# Patient Record
Sex: FEMALE | Race: WHITE | NOT HISPANIC OR LATINO | Employment: UNEMPLOYED | URBAN - METROPOLITAN AREA
[De-identification: names, ages, dates, MRNs, and addresses within clinical notes are randomized per-mention and may not be internally consistent; named-entity substitution may affect disease eponyms.]

---

## 2024-09-03 ENCOUNTER — OFFICE VISIT (OUTPATIENT)
Dept: OBSTETRICS AND GYNECOLOGY | Age: 24
End: 2024-09-03
Payer: COMMERCIAL

## 2024-09-03 VITALS
SYSTOLIC BLOOD PRESSURE: 110 MMHG | HEIGHT: 68 IN | WEIGHT: 132 LBS | BODY MASS INDEX: 20 KG/M2 | DIASTOLIC BLOOD PRESSURE: 66 MMHG

## 2024-09-03 DIAGNOSIS — O09.30 LATE PRENATAL CARE: ICD-10-CM

## 2024-09-03 DIAGNOSIS — Z34.00 SUPERVISION OF NORMAL FIRST PREGNANCY, ANTEPARTUM: Primary | ICD-10-CM

## 2024-09-03 DIAGNOSIS — Z78.9 VEGETARIAN: ICD-10-CM

## 2024-09-03 DIAGNOSIS — Z3A.20 20 WEEKS GESTATION OF PREGNANCY: ICD-10-CM

## 2024-09-03 LAB
BILIRUB BLD-MCNC: NEGATIVE MG/DL
CLARITY, POC: CLEAR
COLOR UR: YELLOW
GLUCOSE UR STRIP-MCNC: NEGATIVE MG/DL
KETONES UR QL: ABNORMAL
LEUKOCYTE EST, POC: NEGATIVE
NITRITE UR-MCNC: NEGATIVE MG/ML
PH UR: 7 [PH] (ref 5–8)
PROT UR STRIP-MCNC: NEGATIVE MG/DL
RBC # UR STRIP: ABNORMAL /UL
SP GR UR: 1.02 (ref 1–1.03)
UROBILINOGEN UR QL: ABNORMAL

## 2024-09-03 PROCEDURE — 99203 OFFICE O/P NEW LOW 30 MIN: CPT | Performed by: STUDENT IN AN ORGANIZED HEALTH CARE EDUCATION/TRAINING PROGRAM

## 2024-09-03 NOTE — PROGRESS NOTES
[facility]   Obstetrics and Gynecology   New Gynecology Visit    9/3/2024    Patient: Aruna Graves          MR#:[id number]    History of Present Illness    Chief Complaint   Patient presents with    Gynecologic Exam     New Gyn, US today for fetal viability, LMP 24, Pt has no complaints today       24 y.o. female  who presents for viability scan.    Patient overall feeling better since the first trimester.  She did have nausea and vomiting at that time but that has resolved.  He is feeling some fatigue but again has improved since the first trimester.  Denies vaginal bleeding or any pelvic pain.    Patient does follow a vegetarian diet and has for many years.  She eats eggs and dairy.  She is trying to get protein from quinoa and beans.  Previously has been on a B12 supplement but is not currently.  She has put on about 10 pounds since getting pregnant    Obstetric History:  OB History          1    Para        Term                AB        Living             SAB        IAB        Ectopic        Molar        Multiple        Live Births                   Menstrual History:     Patient's last menstrual period was 2024 (exact date).       Sexual History:   Monogamous sexual relationship with male    Concern for STI?: denies  Last Pap: ~, normal - would like to defer until post-partum  Abnormal Pap hx: denies  ________________________________________  There is no problem list on file for this patient.    History reviewed. No pertinent past medical history.  Past Surgical History:   Procedure Laterality Date    TONSILLECTOMY  2013    WISDOM TOOTH EXTRACTION  2018     Social History     Tobacco Use   Smoking Status Never   Smokeless Tobacco Never     Family History   Problem Relation Age of Onset    Hypertension Mother     Pancreatic cancer Paternal Grandfather 74        noted as genetic, father has gene as well but no cancer    Breast cancer Maternal Grandmother 65         "Did not test for the gene, was ruled lifestyle induced.    Stroke Maternal Grandmother         Lifestyle induced.    Uterine cancer Neg Hx     Ovarian cancer Neg Hx     Colon cancer Neg Hx     Prostate cancer Neg Hx      Prior to Admission medications    Medication Sig Start Date End Date Taking? Authorizing Provider   phenazopyridine (PYRIDIUM) 200 MG tablet Take 1 tablet by mouth 3 (Three) Times a Day As Needed for Bladder Spasms.  Patient not taking: Reported on 9/3/2024 1/17/24   Roman Georges MD     ________________________________________    Review of Systems   Constitutional:  Negative for chills and fever.   Gastrointestinal:  Negative for abdominal pain, constipation, diarrhea, nausea and vomiting.   Genitourinary:  Negative for dysuria, frequency and urgency.          Objective     /66   Ht 172.7 cm (67.99\")   Wt 59.9 kg (132 lb)   LMP 04/12/2024 (Exact Date)   BMI 20.08 kg/m²    BP Readings from Last 3 Encounters:   09/03/24 110/66   01/17/24 112/71      Wt Readings from Last 3 Encounters:   09/03/24 59.9 kg (132 lb)   01/17/24 54.4 kg (120 lb)        BMI: Estimated body mass index is 20.08 kg/m² as calculated from the following:    Height as of this encounter: 172.7 cm (67.99\").    Weight as of this encounter: 59.9 kg (132 lb).    Physical Exam  Vitals and nursing note reviewed.   Constitutional:       General: She is not in acute distress.     Appearance: Normal appearance.   HENT:      Head: Normocephalic and atraumatic.   Eyes:      Extraocular Movements: Extraocular movements intact.   Cardiovascular:      Rate and Rhythm: Normal rate and regular rhythm.   Pulmonary:      Effort: Pulmonary effort is normal. No respiratory distress.   Abdominal:      General: There is no distension.      Palpations: Abdomen is soft. There is no mass.      Tenderness: There is no abdominal tenderness.   Musculoskeletal:         General: No swelling. Normal range of motion.      Cervical back: Normal range " of motion.   Skin:     General: Skin is warm and dry.   Neurological:      General: No focal deficit present.      Mental Status: She is alert and oriented to person, place, and time.   Psychiatric:         Mood and Affect: Mood normal.         Behavior: Behavior normal.       TVUS 9/3/2024  Impression    1.  Intrauterine pregnancy at 20w4d in vertex position  2.  Viable fetus,  bpm  3.  EDC: Estimated Date of Delivery: None noted.  4.  Cervical length 4.53cm  5.  Anterior placenta  6.  Normal anatomic evaluation - some limited views of heart due to fetal position    ROLLY 2025 by LMP consistent with today's ultrasound    Assessment:  Aruna Graves is a 24 y.o.  at 20w4d by LMP consistent with 20wk US done today presenting for viability scan.    Diagnoses and all orders for this visit:    1. Supervision of normal first pregnancy, antepartum (Primary)  Overview:  -PNL: collect today  -Pap: defer to PP - last ~ NILM per patient report  -Genetics: anatomy US wnl - some limited cardiac views  -Imm: titers today, tdap > 27wga, will discuss covid vaccine  -Contraception: discuss at future visit  -Birthplan: desires vaginal delivery, thinking she does not want epidural  -Care coordination: accepts blood transfusions, no latex allergy, call schedule reviewed, discuss this provider will be on maternity leave when patient delivers - would like to continue care with this provider at this time      Orders:  -     POC Urinalysis Dipstick  -     OB Panel With HIV and RPR  -     Varicella Zoster Antibody, IgG  -     Hemoglobin A1c  -     Cancel: Hepatitis C Antibody  -     Rubella Antibody, IgG  -     Urine Culture - Urine, Urine, Clean Catch  -     Chlamydia trachomatis, Neisseria gonorrhoeae, Trichomonas vaginalis, PCR - Urine, Urine, Clean Catch    2. 20 weeks gestation of pregnancy    3. Vegetarian  Overview:  Does eat eggs and dairy.      4. Late prenatal care  Overview:  First encounter at 20  weeks        Plan:  Return in about 4 weeks (around 10/1/2024) for F/u prenatal 4 wk - initial OB.      Michelle Munroe MD  9/3/2024 09:53 EDT

## 2024-09-04 LAB
ABO GROUP BLD: NORMAL
BASOPHILS # BLD AUTO: 0 X10E3/UL (ref 0–0.2)
BASOPHILS NFR BLD AUTO: 0 %
BLD GP AB SCN SERPL QL: NEGATIVE
EOSINOPHIL # BLD AUTO: 0 X10E3/UL (ref 0–0.4)
EOSINOPHIL NFR BLD AUTO: 1 %
ERYTHROCYTE [DISTWIDTH] IN BLOOD BY AUTOMATED COUNT: 12.2 % (ref 11.7–15.4)
HBA1C MFR BLD: 4.7 % (ref 4.8–5.6)
HBV SURFACE AG SERPL QL IA: NEGATIVE
HCT VFR BLD AUTO: 37.7 % (ref 34–46.6)
HCV AB SERPL QL IA: NORMAL
HCV IGG SERPL QL IA: NON REACTIVE
HGB BLD-MCNC: 12.3 G/DL (ref 11.1–15.9)
HIV 1+2 AB+HIV1 P24 AG SERPL QL IA: NON REACTIVE
IMM GRANULOCYTES # BLD AUTO: 0 X10E3/UL (ref 0–0.1)
IMM GRANULOCYTES NFR BLD AUTO: 0 %
LYMPHOCYTES # BLD AUTO: 1.2 X10E3/UL (ref 0.7–3.1)
LYMPHOCYTES NFR BLD AUTO: 16 %
MCH RBC QN AUTO: 31.3 PG (ref 26.6–33)
MCHC RBC AUTO-ENTMCNC: 32.6 G/DL (ref 31.5–35.7)
MCV RBC AUTO: 96 FL (ref 79–97)
MONOCYTES # BLD AUTO: 0.3 X10E3/UL (ref 0.1–0.9)
MONOCYTES NFR BLD AUTO: 5 %
NEUTROPHILS # BLD AUTO: 5.6 X10E3/UL (ref 1.4–7)
NEUTROPHILS NFR BLD AUTO: 78 %
PLATELET # BLD AUTO: 265 X10E3/UL (ref 150–450)
RBC # BLD AUTO: 3.93 X10E6/UL (ref 3.77–5.28)
RH BLD: POSITIVE
RPR SER QL: NON REACTIVE
RUBV IGG SERPL IA-ACNC: 1.26 INDEX
VZV IGG SER IA-ACNC: 258 INDEX
WBC # BLD AUTO: 7.2 X10E3/UL (ref 3.4–10.8)

## 2024-09-05 LAB
BACTERIA UR CULT: NORMAL
BACTERIA UR CULT: NORMAL
C TRACH RRNA SPEC QL NAA+PROBE: NEGATIVE
N GONORRHOEA RRNA SPEC QL NAA+PROBE: NEGATIVE
T VAGINALIS RRNA SPEC QL NAA+PROBE: NEGATIVE

## 2024-09-06 ENCOUNTER — PATIENT ROUNDING (BHMG ONLY) (OUTPATIENT)
Dept: OBSTETRICS AND GYNECOLOGY | Age: 24
End: 2024-09-06
Payer: COMMERCIAL

## 2024-09-06 NOTE — PROGRESS NOTES
A MY CHART MESSAGE HAS BEEN SENT TO THE PATIENT FOR [facility].

## 2024-10-01 ENCOUNTER — INITIAL PRENATAL (OUTPATIENT)
Dept: OBSTETRICS AND GYNECOLOGY | Age: 24
End: 2024-10-01
Payer: OTHER GOVERNMENT

## 2024-10-01 VITALS — SYSTOLIC BLOOD PRESSURE: 106 MMHG | BODY MASS INDEX: 20.96 KG/M2 | DIASTOLIC BLOOD PRESSURE: 62 MMHG | WEIGHT: 137.8 LBS

## 2024-10-01 DIAGNOSIS — Z34.00 SUPERVISION OF NORMAL FIRST PREGNANCY, ANTEPARTUM: Primary | ICD-10-CM

## 2024-10-01 DIAGNOSIS — O09.30 LATE PRENATAL CARE: ICD-10-CM

## 2024-10-01 DIAGNOSIS — Z3A.24 24 WEEKS GESTATION OF PREGNANCY: ICD-10-CM

## 2024-10-01 DIAGNOSIS — Z78.9 VEGETARIAN: ICD-10-CM

## 2024-10-01 LAB
CLARITY, POC: CLEAR
COLOR UR: YELLOW
GLUCOSE UR STRIP-MCNC: NEGATIVE MG/DL
PROT UR STRIP-MCNC: NEGATIVE MG/DL

## 2024-10-01 NOTE — PROGRESS NOTES
[facility]   Obstetrics and Gynecology   New Obstetric Visit    10/1/2024    Patient: Aruna Graves          MR#:[id number]    History of Present Illness    Chief Complaint   Patient presents with    Initial Prenatal Visit     24w4d Ob f/u, Pt has no complaints today     24 y.o. female  at 24w4d presents for NOB visit.  She is doing well today.  Taking prenatal vitamins.    Starting to feel some fetal movements particularly in morning and at night. Denies vaginal bleeding, leakage of fluid, regular contractions.  ________________________________________  Patient Active Problem List   Diagnosis    Supervision of normal first pregnancy, antepartum    Vegetarian    Late prenatal care     OB History          1    Para        Term                AB        Living             SAB        IAB        Ectopic        Molar        Multiple        Live Births                  Social History:  Denies h/o herpes, syphilis, HIV  Denies family history of birth defects and genetic disorders    Past Medical History:   Diagnosis Date    Vegetarian diet      Past Surgical History:   Procedure Laterality Date    TONSILLECTOMY  2013    WISDOM TOOTH EXTRACTION  2018     Social History     Tobacco Use   Smoking Status Never   Smokeless Tobacco Never     Family History   Problem Relation Age of Onset    Hypertension Mother     Pancreatic cancer Paternal Grandfather 74        noted as genetic, father has gene as well but no cancer    Breast cancer Maternal Grandmother 65        Did not test for the gene, was ruled lifestyle induced.    Stroke Maternal Grandmother         Lifestyle induced.    Uterine cancer Neg Hx     Ovarian cancer Neg Hx     Colon cancer Neg Hx     Prostate cancer Neg Hx      Prior to Admission medications    Medication Sig Start Date End Date Taking? Authorizing Provider   Prenatal Vit-Fe Fumarate-FA (PRENATAL VITAMIN PO) Take  by mouth.   Yes Provider, MD Johnathon  "    ________________________________________    Review of Systems   Constitutional:  Negative for chills and fever.   Gastrointestinal:  Negative for abdominal pain, constipation, diarrhea, nausea and vomiting.   Genitourinary:  Negative for dysuria, frequency and urgency.            Objective     /62   Wt 62.5 kg (137 lb 12.8 oz)   LMP 2024 (Exact Date)   BMI 20.96 kg/m²    BP Readings from Last 3 Encounters:   10/01/24 106/62   24 110/66   24 112/71      Wt Readings from Last 3 Encounters:   10/01/24 62.5 kg (137 lb 12.8 oz)   24 59.9 kg (132 lb)   24 54.4 kg (120 lb)        BMI: Estimated body mass index is 20.96 kg/m² as calculated from the following:    Height as of 9/3/24: 172.7 cm (67.99\").    Weight as of this encounter: 62.5 kg (137 lb 12.8 oz).    Physical Exam  Vitals and nursing note reviewed.   Constitutional:       General: She is not in acute distress.     Appearance: Normal appearance.   HENT:      Head: Normocephalic and atraumatic.   Eyes:      Extraocular Movements: Extraocular movements intact.   Cardiovascular:      Rate and Rhythm: Normal rate and regular rhythm.   Pulmonary:      Effort: Pulmonary effort is normal. No respiratory distress.   Abdominal:      General: There is no distension.      Palpations: Abdomen is soft. There is no mass.      Tenderness: There is no abdominal tenderness.   Musculoskeletal:         General: No swelling. Normal range of motion.      Cervical back: Normal range of motion.   Skin:     General: Skin is warm and dry.   Neurological:      General: No focal deficit present.      Mental Status: She is alert and oriented to person, place, and time.   Psychiatric:         Mood and Affect: Mood normal.         Behavior: Behavior normal.       Assessment:  Aruna Graves is a 24 y.o.  at 24w4d presenting for new OB visit.    Diagnoses and all orders for this visit:    1. Supervision of normal first pregnancy, antepartum " (Primary)  Overview:  -PNL: unremarkable  -Pap: defer to PP - last ~2021 NILM per patient report  -Genetics: declined CF/SMA/NIPS, anatomy US wnl - some limited cardiac views  -Imm: VI/RI, tdap > 27wga, will discuss covid vaccine  -Contraception: discuss at future visit  -Birthplan: desires vaginal delivery, thinking she does not want epidural, would like to keep placenta  -Care coordination: accepts blood transfusions, no latex allergy, call schedule reviewed, discuss this provider will be on maternity leave when patient delivers - would like to continue care with this provider at this time        2. 24 weeks gestation of pregnancy  -     POC Urinalysis Dipstick    3. Late prenatal care  Overview:  First encounter at 20 weeks      4. Vegetarian  Overview:  Does eat eggs and dairy.        Plan:  Return for F/u prenatal 4 wk.    Michelle Munroe MD  10/1/2024 09:04 EDT

## 2024-10-30 ENCOUNTER — TELEPHONE (OUTPATIENT)
Dept: OBSTETRICS AND GYNECOLOGY | Age: 24
End: 2024-10-30

## 2024-10-30 NOTE — TELEPHONE ENCOUNTER
Caller: Star Aruna    Relationship: Self    Best call back number: [phone]    What is the best time to reach you: ANY    Who are you requesting to speak with (clinical staff, provider,  specific staff member): CLINICAL       What was the call regarding: PT IS NEEDING A DENTIST RELEASE FORM FAXED [phone]

## 2024-11-05 ENCOUNTER — ROUTINE PRENATAL (OUTPATIENT)
Dept: OBSTETRICS AND GYNECOLOGY | Age: 24
End: 2024-11-05
Payer: OTHER GOVERNMENT

## 2024-11-05 VITALS — DIASTOLIC BLOOD PRESSURE: 72 MMHG | BODY MASS INDEX: 22.78 KG/M2 | SYSTOLIC BLOOD PRESSURE: 108 MMHG | WEIGHT: 149.8 LBS

## 2024-11-05 DIAGNOSIS — Z34.03 ENCOUNTER FOR SUPERVISION OF NORMAL FIRST PREGNANCY IN THIRD TRIMESTER: Primary | ICD-10-CM

## 2024-11-05 DIAGNOSIS — Z23 NEED FOR DIPHTHERIA-TETANUS-PERTUSSIS (TDAP) VACCINE: ICD-10-CM

## 2024-11-05 DIAGNOSIS — Z13.1 SCREENING FOR DIABETES MELLITUS (DM): ICD-10-CM

## 2024-11-05 DIAGNOSIS — O09.30 LATE PRENATAL CARE: ICD-10-CM

## 2024-11-05 DIAGNOSIS — Z34.00 SUPERVISION OF NORMAL FIRST PREGNANCY, ANTEPARTUM: ICD-10-CM

## 2024-11-05 DIAGNOSIS — Z78.9 VEGETARIAN: ICD-10-CM

## 2024-11-05 PROCEDURE — 0502F SUBSEQUENT PRENATAL CARE: CPT | Performed by: STUDENT IN AN ORGANIZED HEALTH CARE EDUCATION/TRAINING PROGRAM

## 2024-11-05 NOTE — PROGRESS NOTES
[facility]   Obstetrics and Gynecology   Return Obstetric Visit    Aruna Graves, a 24 y.o.  at 29w4d, presents for OB follow-up.  She is doing well today.  Denies loss of fluid, vaginal bleeding, and contractions.  Endorses fetal movements.    Objective  BP Readings from Last 3 Encounters:   24 108/72   10/01/24 106/62   24 110/66      Wt Readings from Last 3 Encounters:   24 67.9 kg (149 lb 12.8 oz)   10/01/24 62.5 kg (137 lb 12.8 oz)   24 59.9 kg (132 lb)      Total weight gain this pregnancy: Not found.    General: Awake, alert, no apparent distress  Respiratory: No increased work of breathing  Abdomen: Fundus soft and nontender  Extremity: Nontender, no edema    Aruna Graves is a 24 y.o.  at 29w4d presenting for OB follow-up.    Diagnoses and all orders for this visit:    1. Encounter for supervision of normal first pregnancy in third trimester (Primary)  -     CBC & Differential  -     Treponema pallidum AB w/Reflex RPR; Future  -     Treponema pallidum AB w/Reflex RPR    2. Screening for diabetes mellitus (DM)  -     Gestational Screen 1 Hr ([facility])    3. Need for diphtheria-tetanus-pertussis (Tdap) vaccine  -     Tdap Vaccine => 8yo IM (BOOSTRIX/ADACEL)    4. Supervision of normal first pregnancy, antepartum  Overview:  -PNL: unremarkable  -Pap: defer to PP - last ~ NILM per patient report  -Genetics: declined CF/SMA/NIPS, anatomy US wnl - some limited cardiac views  -Imm: VI/RI, tdap > 27wga, declines flu/ covid vaccine 2024   -Contraception: discuss at future visit  -Birthplan: desires vaginal delivery, thinking she does not want epidural, would like to keep placenta  -Care coordination: accepts blood transfusions, no latex allergy, call schedule reviewed        5. Late prenatal care  Overview:  First encounter at 20 weeks      6. Vegetarian  Overview:  Does eat eggs and dairy.        Labor precautions reviewed. Declines flu/covid  vaccination.  Return in about 3 weeks (around 11/26/2024) for OB check.    Michelle Munroe MD  11/5/2024 09:48 EST

## 2024-11-06 LAB
BASOPHILS # BLD AUTO: 0.02 10*3/MM3 (ref 0–0.2)
BASOPHILS NFR BLD AUTO: 0.2 % (ref 0–1.5)
EOSINOPHIL # BLD AUTO: 0.03 10*3/MM3 (ref 0–0.4)
EOSINOPHIL NFR BLD AUTO: 0.3 % (ref 0.3–6.2)
ERYTHROCYTE [DISTWIDTH] IN BLOOD BY AUTOMATED COUNT: 11.7 % (ref 12.3–15.4)
GLUCOSE 1H P 50 G GLC PO SERPL-MCNC: 157 MG/DL (ref 65–139)
HCT VFR BLD AUTO: 35 % (ref 34–46.6)
HGB BLD-MCNC: 11.5 G/DL (ref 12–15.9)
IMM GRANULOCYTES # BLD AUTO: 0.08 10*3/MM3 (ref 0–0.05)
IMM GRANULOCYTES NFR BLD AUTO: 0.9 % (ref 0–0.5)
LYMPHOCYTES # BLD AUTO: 1.38 10*3/MM3 (ref 0.7–3.1)
LYMPHOCYTES NFR BLD AUTO: 15 % (ref 19.6–45.3)
MCH RBC QN AUTO: 31.1 PG (ref 26.6–33)
MCHC RBC AUTO-ENTMCNC: 32.9 G/DL (ref 31.5–35.7)
MCV RBC AUTO: 94.6 FL (ref 79–97)
MONOCYTES # BLD AUTO: 0.42 10*3/MM3 (ref 0.1–0.9)
MONOCYTES NFR BLD AUTO: 4.6 % (ref 5–12)
NEUTROPHILS # BLD AUTO: 7.3 10*3/MM3 (ref 1.7–7)
NEUTROPHILS NFR BLD AUTO: 79 % (ref 42.7–76)
NRBC BLD AUTO-RTO: 0 /100 WBC (ref 0–0.2)
PLATELET # BLD AUTO: 244 10*3/MM3 (ref 140–450)
RBC # BLD AUTO: 3.7 10*6/MM3 (ref 3.77–5.28)
TREPONEMA PALLIDUM IGG+IGM AB [PRESENCE] IN SERUM OR PLASMA BY IMMUNOASSAY: NON REACTIVE
WBC # BLD AUTO: 9.23 10*3/MM3 (ref 3.4–10.8)

## 2024-11-07 PROBLEM — O99.810 ABNORMAL GLUCOSE IN PREGNANCY, ANTEPARTUM: Status: ACTIVE | Noted: 2024-11-07

## 2024-11-13 DIAGNOSIS — R73.09 ABNORMAL GLUCOSE TOLERANCE TEST (GTT): Primary | ICD-10-CM

## 2024-11-26 ENCOUNTER — ROUTINE PRENATAL (OUTPATIENT)
Dept: OBSTETRICS AND GYNECOLOGY | Age: 24
End: 2024-11-26
Payer: OTHER GOVERNMENT

## 2024-11-26 VITALS — BODY MASS INDEX: 22.51 KG/M2 | WEIGHT: 148 LBS | DIASTOLIC BLOOD PRESSURE: 72 MMHG | SYSTOLIC BLOOD PRESSURE: 110 MMHG

## 2024-11-26 DIAGNOSIS — O99.019 MATERNAL ANEMIA IN PREGNANCY, ANTEPARTUM: ICD-10-CM

## 2024-11-26 DIAGNOSIS — O09.30 LATE PRENATAL CARE: ICD-10-CM

## 2024-11-26 DIAGNOSIS — Z3A.32 32 WEEKS GESTATION OF PREGNANCY: Primary | ICD-10-CM

## 2024-11-26 LAB
GLUCOSE UR STRIP-MCNC: NEGATIVE MG/DL
PROT UR STRIP-MCNC: NEGATIVE MG/DL

## 2024-11-26 NOTE — PROGRESS NOTES
Chief Complaint   Patient presents with    Routine Prenatal Visit     32 week ob visit.        HPI: 24 y.o.  at 32w4d gestation  She is doing well  Feels good  Notes good FM  Is slightly low on her hgb   Will work on diet changes and increase bean intake  She measures slightly on the small side, possible transverse?   Plan EFW at next visit       Vitals:    24 0918   BP: 110/72   Weight: 67.1 kg (148 lb)       ROS:  GI:  Negative  : na  Pulmonary: Negative     A/P  1. Intrauterine pregnancy at 32w4d   2. Pregnancy Risk:  NORMAL    Diagnoses and all orders for this visit:    1. 32 weeks gestation of pregnancy (Primary)  -     POC Urinalysis Dipstick, Multipro    2. Late prenatal care    3. Maternal anemia in pregnancy, antepartum        -----------------------  PLAN:   Return in about 2 weeks (around 12/10/2024) for belly check with Dr Ramírez or Dr Drew, schedule u/s to check EFW/weight.      BOZENA Pradhan  2024 09:43 EST

## 2024-12-12 ENCOUNTER — ROUTINE PRENATAL (OUTPATIENT)
Dept: OBSTETRICS AND GYNECOLOGY | Age: 24
End: 2024-12-12
Payer: OTHER GOVERNMENT

## 2024-12-12 VITALS — DIASTOLIC BLOOD PRESSURE: 72 MMHG | BODY MASS INDEX: 23.09 KG/M2 | SYSTOLIC BLOOD PRESSURE: 130 MMHG | WEIGHT: 151.8 LBS

## 2024-12-12 DIAGNOSIS — Z3A.34 34 WEEKS GESTATION OF PREGNANCY: Primary | ICD-10-CM

## 2024-12-12 DIAGNOSIS — Z78.9 VEGETARIAN: ICD-10-CM

## 2024-12-12 DIAGNOSIS — Z34.00 SUPERVISION OF NORMAL FIRST PREGNANCY, ANTEPARTUM: ICD-10-CM

## 2024-12-12 DIAGNOSIS — Z34.03 ENCOUNTER FOR SUPERVISION OF NORMAL FIRST PREGNANCY IN THIRD TRIMESTER: ICD-10-CM

## 2024-12-12 DIAGNOSIS — O99.810 ABNORMAL GLUCOSE IN PREGNANCY, ANTEPARTUM: ICD-10-CM

## 2024-12-12 DIAGNOSIS — O09.30 LATE PRENATAL CARE: ICD-10-CM

## 2024-12-12 NOTE — PROGRESS NOTES
Aruna Graves, a 24 y.o.  at 34w6d, presents for OB follow-up.  She is doing well today.  Denies loss of fluid, vaginal bleeding, and contractions.  Endorses fetal movements.    Objective  BP Readings from Last 3 Encounters:   24 130/72   24 110/72   24 108/72      Wt Readings from Last 3 Encounters:   24 68.9 kg (151 lb 12.8 oz)   24 67.1 kg (148 lb)   24 67.9 kg (149 lb 12.8 oz)      Total weight gain this pregnancy: Not found.    General: Awake, alert, no apparent distress  Respiratory: No increased work of breathing  Abdomen: Fundus soft and nontender  Extremity: Nontender, no edema    A/P  Diagnoses and all orders for this visit:    1. 34 weeks gestation of pregnancy (Primary)    2. Encounter for supervision of normal first pregnancy in third trimester  -     POC Urinalysis Dipstick    3. Abnormal glucose in pregnancy, antepartum  Overview:  Elevated 1 hour GTT  4/4 3 hour GTT normal      4. Supervision of normal first pregnancy, antepartum  Overview:  -PNL: unremarkable  -Pap: defer to PP - last ~ NILM per patient report  -Genetics: declined CF/SMA/NIPS, anatomy US wnl - some limited cardiac views  -Imm: VI/RI, declined tdap 2024, declines flu/ covid vaccine 2024   -Contraception: discuss at future visit  -Birthplan: desires vaginal delivery, thinking she does not want epidural, would like to keep placenta  -Care coordination: accepts blood transfusions, no latex allergy, call schedule reviewed        5. Late prenatal care  Overview:  First encounter at 20 weeks      6. Vegetarian  Overview:  Does eat eggs and dairy.          Labor precautions reviewed  Return in about 2 weeks (around 2024).    Amy Drew MD

## 2024-12-26 ENCOUNTER — ROUTINE PRENATAL (OUTPATIENT)
Dept: OBSTETRICS AND GYNECOLOGY | Age: 24
End: 2024-12-26
Payer: OTHER GOVERNMENT

## 2024-12-26 VITALS — DIASTOLIC BLOOD PRESSURE: 78 MMHG | WEIGHT: 156.8 LBS | BODY MASS INDEX: 23.85 KG/M2 | SYSTOLIC BLOOD PRESSURE: 122 MMHG

## 2024-12-26 DIAGNOSIS — Z34.00 SUPERVISION OF NORMAL FIRST PREGNANCY, ANTEPARTUM: ICD-10-CM

## 2024-12-26 DIAGNOSIS — Z78.9 VEGETARIAN: ICD-10-CM

## 2024-12-26 DIAGNOSIS — Z34.03 ENCOUNTER FOR SUPERVISION OF NORMAL FIRST PREGNANCY IN THIRD TRIMESTER: Primary | ICD-10-CM

## 2024-12-26 DIAGNOSIS — O09.30 LATE PRENATAL CARE: ICD-10-CM

## 2024-12-26 DIAGNOSIS — O99.810 ABNORMAL GLUCOSE IN PREGNANCY, ANTEPARTUM: ICD-10-CM

## 2024-12-26 NOTE — PROGRESS NOTES
Aruna Graves, a 24 y.o.  at 36w6d, presents for OB follow-up.  She is doing well today.  Denies loss of fluid, vaginal bleeding, and contractions.  Endorses fetal movements.    Objective  BP Readings from Last 3 Encounters:   24 122/78   24 130/72   24 110/72      Wt Readings from Last 3 Encounters:   24 71.1 kg (156 lb 12.8 oz)   24 68.9 kg (151 lb 12.8 oz)   24 67.1 kg (148 lb)      Total weight gain this pregnancy: Not found.    General: Awake, alert, no apparent distress  Respiratory: No increased work of breathing  Abdomen: Fundus soft and nontender  Extremity: Nontender, no edema    A/P  Diagnoses and all orders for this visit:    1. Encounter for supervision of normal first pregnancy in third trimester (Primary)  -     POC Urinalysis Dipstick  -     Group B Streptococcus Culture - Swab, Vaginal/Rectum    2. Late prenatal care  Overview:  First encounter at 20 weeks      3. Vegetarian  Overview:  Does eat eggs and dairy.      4. Abnormal glucose in pregnancy, antepartum  Overview:  Elevated 1 hour GTT  4/4 3 hour GTT normal      5. Supervision of normal first pregnancy, antepartum  Overview:  -PNL: unremarkable  -Pap: defer to PP - last ~ NILM per patient report  -Genetics: declined CF/SMA/NIPS, anatomy US wnl - some limited cardiac views  -Imm: VI/RI, declined tdap 2024, declines flu/ covid vaccine 2024   -Contraception: discuss at future visit  -Birthplan: desires vaginal delivery, thinking she does not want epidural, would like to keep placenta  -Care coordination: accepts blood transfusions, no latex allergy, call schedule reviewed            Labor precautions reviewed  Return in about 1 week (around 2025).    Amy Drew MD

## 2024-12-30 LAB — B-HEM STREP SPEC QL CULT: NEGATIVE

## 2025-01-02 ENCOUNTER — TELEPHONE (OUTPATIENT)
Dept: OBSTETRICS AND GYNECOLOGY | Age: 25
End: 2025-01-02

## 2025-01-02 ENCOUNTER — ROUTINE PRENATAL (OUTPATIENT)
Dept: OBSTETRICS AND GYNECOLOGY | Age: 25
End: 2025-01-02
Payer: OTHER GOVERNMENT

## 2025-01-02 VITALS — BODY MASS INDEX: 23.57 KG/M2 | WEIGHT: 155 LBS | SYSTOLIC BLOOD PRESSURE: 122 MMHG | DIASTOLIC BLOOD PRESSURE: 70 MMHG

## 2025-01-02 DIAGNOSIS — Z78.9 VEGETARIAN: ICD-10-CM

## 2025-01-02 DIAGNOSIS — O09.30 LATE PRENATAL CARE: ICD-10-CM

## 2025-01-02 DIAGNOSIS — Z34.00 SUPERVISION OF NORMAL FIRST PREGNANCY, ANTEPARTUM: Primary | ICD-10-CM

## 2025-01-02 DIAGNOSIS — Z3A.37 37 WEEKS GESTATION OF PREGNANCY: ICD-10-CM

## 2025-01-02 DIAGNOSIS — O99.810 ABNORMAL GLUCOSE IN PREGNANCY, ANTEPARTUM: ICD-10-CM

## 2025-01-02 LAB
GLUCOSE UR STRIP-MCNC: NEGATIVE MG/DL
PROT UR STRIP-MCNC: NEGATIVE MG/DL

## 2025-01-02 NOTE — PROGRESS NOTES
Chief Complaint   Patient presents with    Routine Prenatal Visit     37 weeks  CC:  no problems       HPI: 24 y.o.  at 37w6d     Doing well  Reports good FM  Denies LOF, bleeding or ctx's  Pt of Dr. Drew    Vitals:    25 0901   BP: 122/70   Weight: 70.3 kg (155 lb)       ROS:  GI:  Negative  : Negative  Pulmonary: Negative     A/P  1. Intrauterine pregnancy at 37w6d   2. Pregnancy Risk:  NORMAL    Diagnoses and all orders for this visit:    1. Supervision of normal first pregnancy, antepartum (Primary)    2. 37 weeks gestation of pregnancy  -     POC Urinalysis Dipstick    3. Late prenatal care    4. Vegetarian    5. Abnormal glucose in pregnancy, antepartum        -----------------------  PLAN:   Labor warnings/FKC discussed  Return for Next scheduled follow up.      Lesly Robb, APRN  2025 09:18 EST

## 2025-01-02 NOTE — TELEPHONE ENCOUNTER
Caller: Aruna Graves    Relationship: Self    Best call back number:     What is the best time to reach you: ANY    Who are you requesting to speak with (clinical staff, provider,  specific staff member): CLINICAL       What was the call regarding: PT IS NEEDING A COPY OF HER BREAST PUMP PRESCRIPTION SENT TO HER EMAIL; [email or web address]

## 2025-01-03 NOTE — TELEPHONE ENCOUNTER
"I spoke with patient she states   \" she don't want to order my breast pump from [employer] because once they verified my insurance, I only qualified for one option. I contacted my insurance and spoke to them and they provided me with other providers that I can go through that have more options available that I qualify for. What I need to get the breast pump is a prescription for a breast pump from my OBGYN\"     Is this something we do?  "

## 2025-01-06 DIAGNOSIS — Z78.9 EXCLUSIVE BREASTFEEDING BY MOTHER: Primary | ICD-10-CM

## 2025-01-06 RX ORDER — BREAST PUMP
1 EACH MISCELLANEOUS ONCE
Qty: 1 EACH | Refills: 0 | Status: SHIPPED | OUTPATIENT
Start: 2025-01-06 | End: 2025-01-06

## 2025-01-09 ENCOUNTER — ROUTINE PRENATAL (OUTPATIENT)
Dept: OBSTETRICS AND GYNECOLOGY | Age: 25
End: 2025-01-09
Payer: OTHER GOVERNMENT

## 2025-01-09 VITALS — WEIGHT: 159 LBS | BODY MASS INDEX: 24.18 KG/M2 | DIASTOLIC BLOOD PRESSURE: 72 MMHG | SYSTOLIC BLOOD PRESSURE: 122 MMHG

## 2025-01-09 DIAGNOSIS — Z34.00 SUPERVISION OF NORMAL FIRST PREGNANCY, ANTEPARTUM: ICD-10-CM

## 2025-01-09 DIAGNOSIS — O09.30 LATE PRENATAL CARE: ICD-10-CM

## 2025-01-09 DIAGNOSIS — Z34.03 ENCOUNTER FOR SUPERVISION OF NORMAL FIRST PREGNANCY IN THIRD TRIMESTER: Primary | ICD-10-CM

## 2025-01-09 DIAGNOSIS — O99.810 ABNORMAL GLUCOSE IN PREGNANCY, ANTEPARTUM: ICD-10-CM

## 2025-01-09 NOTE — PROGRESS NOTES
Aruna Graves, a 24 y.o.  at 38w6d, presents for OB follow-up.  She is doing well today.  Denies loss of fluid, vaginal bleeding, and contractions.  Endorses fetal movements.    Objective  BP Readings from Last 3 Encounters:   25 122/72   25 122/70   24 122/78      Wt Readings from Last 3 Encounters:   25 72.1 kg (159 lb)   25 70.3 kg (155 lb)   24 71.1 kg (156 lb 12.8 oz)      Total weight gain this pregnancy: Not found.    General: Awake, alert, no apparent distress  Respiratory: No increased work of breathing  Abdomen: Fundus soft and nontender  Extremity: Nontender, no edema    A/P  Diagnoses and all orders for this visit:    1. Encounter for supervision of normal first pregnancy in third trimester (Primary)  -     POC Urinalysis Dipstick    2. Abnormal glucose in pregnancy, antepartum  Overview:  Elevated 1 hour GTT  4/4 3 hour GTT normal      3. Supervision of normal first pregnancy, antepartum  Overview:  -PNL: unremarkable  -Pap: defer to PP - last ~ NILM per patient report  -Genetics: declined CF/SMA/NIPS, anatomy US wnl - some limited cardiac views  -Imm: VI/RI, declined tdap 2024, declines flu/ covid vaccine 2024   -Contraception: discuss at future visit  -Birthplan: desires vaginal delivery, thinking she does not want epidural, would like to keep placenta  -Care coordination: accepts blood transfusions, no latex allergy, call schedule reviewed        4. Late prenatal care  Overview:  First encounter at 20 weeks          Labor precautions reviewed  No follow-ups on file.    Amy Drew MD

## 2025-01-11 ENCOUNTER — HOSPITAL ENCOUNTER (INPATIENT)
Facility: HOSPITAL | Age: 25
LOS: 2 days | Discharge: HOME | End: 2025-01-13
Attending: OBSTETRICS & GYNECOLOGY | Admitting: OBSTETRICS & GYNECOLOGY
Payer: OTHER GOVERNMENT

## 2025-01-11 ENCOUNTER — HOSPITAL ENCOUNTER (EMERGENCY)
Facility: HOSPITAL | Age: 25
Discharge: HOME | End: 2025-01-11
Attending: STUDENT IN AN ORGANIZED HEALTH CARE EDUCATION/TRAINING PROGRAM | Admitting: OBSTETRICS & GYNECOLOGY
Payer: OTHER GOVERNMENT

## 2025-01-11 VITALS
DIASTOLIC BLOOD PRESSURE: 70 MMHG | OXYGEN SATURATION: 98 % | SYSTOLIC BLOOD PRESSURE: 125 MMHG | HEART RATE: 98 BPM | BODY MASS INDEX: 24.19 KG/M2 | WEIGHT: 159.6 LBS | HEIGHT: 68 IN | RESPIRATION RATE: 16 BRPM

## 2025-01-11 PROBLEM — Z34.90 PREGNANCY: Status: ACTIVE | Noted: 2025-01-11

## 2025-01-11 LAB
A1 MICROGLOB PLACENTAL VAG QL: POSITIVE
ABO GROUP BLD: NORMAL
ALBUMIN SERPL-MCNC: 3.5 G/DL (ref 3.5–5.2)
ALBUMIN/GLOB SERPL: 1.3 G/DL
ALP SERPL-CCNC: 129 U/L (ref 39–117)
ALT SERPL W P-5'-P-CCNC: 15 U/L (ref 1–33)
ANION GAP SERPL CALCULATED.3IONS-SCNC: 12 MMOL/L (ref 5–15)
AST SERPL-CCNC: 20 U/L (ref 1–32)
BACTERIA UR QL AUTO: ABNORMAL /HPF
BASOPHILS # BLD AUTO: 0.02 10*3/MM3 (ref 0–0.2)
BASOPHILS NFR BLD AUTO: 0.2 % (ref 0–1.5)
BILIRUB SERPL-MCNC: 0.3 MG/DL (ref 0–1.2)
BILIRUB UR QL STRIP: NEGATIVE
BLD GP AB SCN SERPL QL: NEGATIVE
BUN SERPL-MCNC: 8 MG/DL (ref 6–20)
BUN/CREAT SERPL: 13.1 (ref 7–25)
CALCIUM SPEC-SCNC: 9.3 MG/DL (ref 8.6–10.5)
CHLORIDE SERPL-SCNC: 104 MMOL/L (ref 98–107)
CLARITY UR: ABNORMAL
CO2 SERPL-SCNC: 19 MMOL/L (ref 22–29)
COLOR UR: YELLOW
CREAT SERPL-MCNC: 0.61 MG/DL (ref 0.57–1)
DEPRECATED RDW RBC AUTO: 38.9 FL (ref 37–54)
EGFRCR SERPLBLD CKD-EPI 2021: 128.2 ML/MIN/1.73
EOSINOPHIL # BLD AUTO: 0.04 10*3/MM3 (ref 0–0.4)
EOSINOPHIL NFR BLD AUTO: 0.3 % (ref 0.3–6.2)
ERYTHROCYTE [DISTWIDTH] IN BLOOD BY AUTOMATED COUNT: 12 % (ref 12.3–15.4)
GLOBULIN UR ELPH-MCNC: 2.6 GM/DL
GLUCOSE SERPL-MCNC: 88 MG/DL (ref 65–99)
GLUCOSE UR STRIP-MCNC: NEGATIVE MG/DL
HCT VFR BLD AUTO: 32 % (ref 34–46.6)
HGB BLD-MCNC: 11.2 G/DL (ref 12–15.9)
HGB UR QL STRIP.AUTO: ABNORMAL
HYALINE CASTS UR QL AUTO: ABNORMAL /LPF
IMM GRANULOCYTES # BLD AUTO: 0.06 10*3/MM3 (ref 0–0.05)
IMM GRANULOCYTES NFR BLD AUTO: 0.5 % (ref 0–0.5)
KETONES UR QL STRIP: NEGATIVE
LEUKOCYTE ESTERASE UR QL STRIP.AUTO: NEGATIVE
LYMPHOCYTES # BLD AUTO: 1.58 10*3/MM3 (ref 0.7–3.1)
LYMPHOCYTES NFR BLD AUTO: 12.3 % (ref 19.6–45.3)
MCH RBC QN AUTO: 31.4 PG (ref 26.6–33)
MCHC RBC AUTO-ENTMCNC: 35 G/DL (ref 31.5–35.7)
MCV RBC AUTO: 89.6 FL (ref 79–97)
MONOCYTES # BLD AUTO: 0.6 10*3/MM3 (ref 0.1–0.9)
MONOCYTES NFR BLD AUTO: 4.7 % (ref 5–12)
NEUTROPHILS NFR BLD AUTO: 10.53 10*3/MM3 (ref 1.7–7)
NEUTROPHILS NFR BLD AUTO: 82 % (ref 42.7–76)
NITRITE UR QL STRIP: NEGATIVE
NRBC BLD AUTO-RTO: 0 /100 WBC (ref 0–0.2)
PH UR STRIP.AUTO: 7.5 [PH] (ref 5–8)
PLATELET # BLD AUTO: 268 10*3/MM3 (ref 140–450)
PMV BLD AUTO: 9.3 FL (ref 6–12)
POTASSIUM SERPL-SCNC: 4.1 MMOL/L (ref 3.5–5.2)
PROT SERPL-MCNC: 6.1 G/DL (ref 6–8.5)
PROT UR QL STRIP: NEGATIVE
RBC # BLD AUTO: 3.57 10*6/MM3 (ref 3.77–5.28)
RBC # UR STRIP: ABNORMAL /HPF
REF LAB TEST METHOD: ABNORMAL
RH BLD: POSITIVE
SODIUM SERPL-SCNC: 135 MMOL/L (ref 136–145)
SP GR UR STRIP: 1.02 (ref 1–1.03)
SQUAMOUS #/AREA URNS HPF: ABNORMAL /HPF
T&S EXPIRATION DATE: NORMAL
TREPONEMA PALLIDUM IGG+IGM AB [PRESENCE] IN SERUM OR PLASMA BY IMMUNOASSAY: NORMAL
UROBILINOGEN UR QL STRIP: ABNORMAL
WBC # UR STRIP: ABNORMAL /HPF
WBC NRBC COR # BLD AUTO: 12.83 10*3/MM3 (ref 3.4–10.8)

## 2025-01-11 PROCEDURE — 86900 BLOOD TYPING SEROLOGIC ABO: CPT | Performed by: OBSTETRICS & GYNECOLOGY

## 2025-01-11 PROCEDURE — 99284 EMERGENCY DEPT VISIT MOD MDM: CPT | Performed by: OBSTETRICS & GYNECOLOGY

## 2025-01-11 PROCEDURE — 81001 URINALYSIS AUTO W/SCOPE: CPT | Performed by: OBSTETRICS & GYNECOLOGY

## 2025-01-11 PROCEDURE — 59025 FETAL NON-STRESS TEST: CPT

## 2025-01-11 PROCEDURE — 86850 RBC ANTIBODY SCREEN: CPT | Performed by: OBSTETRICS & GYNECOLOGY

## 2025-01-11 PROCEDURE — 87086 URINE CULTURE/COLONY COUNT: CPT | Performed by: OBSTETRICS & GYNECOLOGY

## 2025-01-11 PROCEDURE — 86780 TREPONEMA PALLIDUM: CPT | Performed by: OBSTETRICS & GYNECOLOGY

## 2025-01-11 PROCEDURE — 85025 COMPLETE CBC W/AUTO DIFF WBC: CPT | Performed by: OBSTETRICS & GYNECOLOGY

## 2025-01-11 PROCEDURE — 80053 COMPREHEN METABOLIC PANEL: CPT | Performed by: OBSTETRICS & GYNECOLOGY

## 2025-01-11 PROCEDURE — 84112 EVAL AMNIOTIC FLUID PROTEIN: CPT | Performed by: OBSTETRICS & GYNECOLOGY

## 2025-01-11 PROCEDURE — 86901 BLOOD TYPING SEROLOGIC RH(D): CPT | Performed by: OBSTETRICS & GYNECOLOGY

## 2025-01-11 PROCEDURE — 99202 OFFICE O/P NEW SF 15 MIN: CPT | Performed by: OBSTETRICS & GYNECOLOGY

## 2025-01-11 RX ORDER — FAMOTIDINE 20 MG/1
20 TABLET, FILM COATED ORAL 2 TIMES DAILY PRN
Status: DISCONTINUED | OUTPATIENT
Start: 2025-01-11 | End: 2025-01-12 | Stop reason: HOSPADM

## 2025-01-11 RX ORDER — ONDANSETRON 2 MG/ML
4 INJECTION INTRAMUSCULAR; INTRAVENOUS ONCE AS NEEDED
Status: DISCONTINUED | OUTPATIENT
Start: 2025-01-11 | End: 2025-01-12 | Stop reason: HOSPADM

## 2025-01-11 RX ORDER — BUTORPHANOL TARTRATE 2 MG/ML
2 INJECTION, SOLUTION INTRAMUSCULAR; INTRAVENOUS
Status: DISCONTINUED | OUTPATIENT
Start: 2025-01-11 | End: 2025-01-12 | Stop reason: HOSPADM

## 2025-01-11 RX ORDER — SODIUM CHLORIDE, SODIUM LACTATE, POTASSIUM CHLORIDE, CALCIUM CHLORIDE 600; 310; 30; 20 MG/100ML; MG/100ML; MG/100ML; MG/100ML
125 INJECTION, SOLUTION INTRAVENOUS CONTINUOUS
Status: DISCONTINUED | OUTPATIENT
Start: 2025-01-11 | End: 2025-01-13 | Stop reason: HOSPADM

## 2025-01-11 RX ORDER — ONDANSETRON 4 MG/1
4 TABLET, ORALLY DISINTEGRATING ORAL EVERY 6 HOURS PRN
Status: DISCONTINUED | OUTPATIENT
Start: 2025-01-11 | End: 2025-01-12 | Stop reason: HOSPADM

## 2025-01-11 RX ORDER — LIDOCAINE HYDROCHLORIDE 10 MG/ML
0.5 INJECTION, SOLUTION INFILTRATION; PERINEURAL ONCE AS NEEDED
Status: DISCONTINUED | OUTPATIENT
Start: 2025-01-11 | End: 2025-01-12 | Stop reason: HOSPADM

## 2025-01-11 RX ORDER — ACETAMINOPHEN 325 MG/1
650 TABLET ORAL EVERY 4 HOURS PRN
Status: DISCONTINUED | OUTPATIENT
Start: 2025-01-11 | End: 2025-01-12 | Stop reason: HOSPADM

## 2025-01-11 RX ORDER — SODIUM CHLORIDE 0.9 % (FLUSH) 0.9 %
10 SYRINGE (ML) INJECTION AS NEEDED
Status: DISCONTINUED | OUTPATIENT
Start: 2025-01-11 | End: 2025-01-12 | Stop reason: HOSPADM

## 2025-01-11 RX ORDER — ONDANSETRON 2 MG/ML
4 INJECTION INTRAMUSCULAR; INTRAVENOUS EVERY 6 HOURS PRN
Status: DISCONTINUED | OUTPATIENT
Start: 2025-01-11 | End: 2025-01-12 | Stop reason: HOSPADM

## 2025-01-11 RX ORDER — DIPHENHYDRAMINE HYDROCHLORIDE 50 MG/ML
12.5 INJECTION INTRAMUSCULAR; INTRAVENOUS EVERY 8 HOURS PRN
Status: DISCONTINUED | OUTPATIENT
Start: 2025-01-11 | End: 2025-01-12 | Stop reason: HOSPADM

## 2025-01-11 RX ORDER — SODIUM CHLORIDE 0.9 % (FLUSH) 0.9 %
10 SYRINGE (ML) INJECTION EVERY 12 HOURS SCHEDULED
Status: DISCONTINUED | OUTPATIENT
Start: 2025-01-11 | End: 2025-01-12 | Stop reason: HOSPADM

## 2025-01-11 RX ORDER — FAMOTIDINE 10 MG/ML
20 INJECTION, SOLUTION INTRAVENOUS 2 TIMES DAILY PRN
Status: DISCONTINUED | OUTPATIENT
Start: 2025-01-11 | End: 2025-01-12 | Stop reason: HOSPADM

## 2025-01-11 RX ORDER — FENTANYL/ROPIVACAINE/NS/PF 2MCG/ML-.2
10 PLASTIC BAG, INJECTION (ML) INJECTION CONTINUOUS
Status: DISCONTINUED | OUTPATIENT
Start: 2025-01-11 | End: 2025-01-13 | Stop reason: HOSPADM

## 2025-01-11 RX ORDER — MAGNESIUM CARB/ALUMINUM HYDROX 105-160MG
30 TABLET,CHEWABLE ORAL ONCE AS NEEDED
Status: DISCONTINUED | OUTPATIENT
Start: 2025-01-11 | End: 2025-01-12 | Stop reason: HOSPADM

## 2025-01-11 RX ORDER — EPHEDRINE SULFATE 50 MG/ML
5 INJECTION, SOLUTION INTRAVENOUS
Status: DISCONTINUED | OUTPATIENT
Start: 2025-01-11 | End: 2025-01-12 | Stop reason: HOSPADM

## 2025-01-11 RX ORDER — SODIUM CHLORIDE 9 MG/ML
40 INJECTION, SOLUTION INTRAVENOUS AS NEEDED
Status: DISCONTINUED | OUTPATIENT
Start: 2025-01-11 | End: 2025-01-12 | Stop reason: HOSPADM

## 2025-01-11 RX ORDER — FAMOTIDINE 10 MG/ML
20 INJECTION, SOLUTION INTRAVENOUS ONCE AS NEEDED
Status: DISCONTINUED | OUTPATIENT
Start: 2025-01-11 | End: 2025-01-12 | Stop reason: HOSPADM

## 2025-01-11 RX ORDER — TERBUTALINE SULFATE 1 MG/ML
0.25 INJECTION, SOLUTION SUBCUTANEOUS AS NEEDED
Status: DISCONTINUED | OUTPATIENT
Start: 2025-01-11 | End: 2025-01-12 | Stop reason: HOSPADM

## 2025-01-11 NOTE — OBED NOTES
"[facility]  Aruna Graves  : 2000  MRN: [id number]  CSN: [id number]    OB ED Provider Note    Subjective   Chief Complaint   Patient presents with    Contractions     Patient has been phyllis irregularly for the last 4 hours. Contractions were every 5 minutes, lasting for 1 minute for the last hour but spaced out upon arrival. FHT reactive and patient not having pain at this time. +FM and brown spotting noted. Denies leaking fluid.     Aruna Graves is a 24 y.o. year old  with an Estimated Date of Delivery: 25 currently at 39w1d presenting with CTX since 0300. They were initially every 6-8 min, increasing to every 5 min for 1 hours, but spaced out again en route. She denies ROM or VB. FM is present.    Prenatal care has been with Dr. Drew.  It has been complicated by abnormal 1 hour OGTT, normal 3 hour .    OB History    Para Term  AB Living   1 0 0 0 0 0   SAB IAB Ectopic Molar Multiple Live Births   0 0 0 0 0 0      # Outcome Date GA Lbr Harrison/2nd Weight Sex Type Anes PTL Lv   1 Current              Past Medical History:   Diagnosis Date    Vegetarian diet      Past Surgical History:   Procedure Laterality Date    TONSILLECTOMY  2013    WISDOM TOOTH EXTRACTION       No current facility-administered medications for this encounter.    No Known Allergies  Social History    Tobacco Use      Smoking status: Never      Smokeless tobacco: Never    Review of Systems   Gastrointestinal:  Positive for abdominal pain.   All other systems reviewed and are negative.        Objective   /70 (BP Location: Right arm, Patient Position: Lying)   Pulse 98   Resp 16   Ht 172.7 cm (68\")   Wt 72.4 kg (159 lb 9.6 oz)   LMP 2024 (Exact Date)   SpO2 98%   BMI 24.27 kg/m²   General: well developed; well nourished  no acute distress  mentation appropriate   Abdomen: soft, non-tender; no masses  gravid    FHT's: reactive and category 1      Cervix: was checked (by RN): 1 cm / 50 % " / -1 unchanged after 2 hours   Presentation: cephalic   Contractions: irregular   Chest: Unlabored respirations    CV:  RRR   Ext:   No C/C/E   Back: CVA tenderness is deferred bilateral        Prenatal Labs  Lab Results   Component Value Date    HGB 11.5 (L) 11/05/2024    RUBELLAABIGG 1.26 09/03/2024    HEPBSAG Negative 09/03/2024    ABSCRN Negative 09/03/2024    BXK7DPK8 Non Reactive 09/03/2024     (H) 11/05/2024    GGTFASTING 75 11/15/2024    EPP8OSDN 160 11/15/2024    IXZ2WGLO 131 11/15/2024    MBO9HLXO 60 (L) 11/15/2024    STREPGPB Negative 12/26/2024    URINECX Final report 09/03/2024    CHLAMNAA Negative 09/03/2024    NGONORRHON Negative 09/03/2024       Current Labs Reviewed   UA:    Lab Results   Component Value Date    KETONESU Trace (A) 09/03/2024    LEUKOCYTESUR Negative 09/03/2024          Assessment   IUP at 39w1d  False labor- no cervical change     Plan   D/C home with labor precautions. Return for worsening symptoms or acute changes. Keep scheduled prenatal appointments.    Sada Ivey MD  1/11/2025  07:34 EST

## 2025-01-12 PROCEDURE — 0HQ9XZZ REPAIR PERINEUM SKIN, EXTERNAL APPROACH: ICD-10-PCS | Performed by: OBSTETRICS & GYNECOLOGY

## 2025-01-12 PROCEDURE — 25010000002 METHYLERGONOVINE MALEATE PER 0.2 MG: Performed by: OBSTETRICS & GYNECOLOGY

## 2025-01-12 PROCEDURE — 59400 OBSTETRICAL CARE: CPT | Performed by: OBSTETRICS & GYNECOLOGY

## 2025-01-12 PROCEDURE — 25810000003 LACTATED RINGERS PER 1000 ML: Performed by: OBSTETRICS & GYNECOLOGY

## 2025-01-12 RX ORDER — HYDROCODONE BITARTRATE AND ACETAMINOPHEN 10; 325 MG/1; MG/1
1 TABLET ORAL EVERY 4 HOURS PRN
Status: DISCONTINUED | OUTPATIENT
Start: 2025-01-12 | End: 2025-01-13 | Stop reason: HOSPADM

## 2025-01-12 RX ORDER — BISACODYL 10 MG
10 SUPPOSITORY, RECTAL RECTAL DAILY PRN
Status: DISCONTINUED | OUTPATIENT
Start: 2025-01-13 | End: 2025-01-13 | Stop reason: HOSPADM

## 2025-01-12 RX ORDER — DOCUSATE SODIUM 100 MG/1
100 CAPSULE, LIQUID FILLED ORAL 2 TIMES DAILY
Status: DISCONTINUED | OUTPATIENT
Start: 2025-01-12 | End: 2025-01-13 | Stop reason: HOSPADM

## 2025-01-12 RX ORDER — ACETAMINOPHEN 325 MG/1
650 TABLET ORAL EVERY 6 HOURS PRN
Status: DISCONTINUED | OUTPATIENT
Start: 2025-01-12 | End: 2025-01-13 | Stop reason: HOSPADM

## 2025-01-12 RX ORDER — CALCIUM CARBONATE 500 MG/1
2 TABLET, CHEWABLE ORAL 3 TIMES DAILY PRN
Status: DISCONTINUED | OUTPATIENT
Start: 2025-01-12 | End: 2025-01-13 | Stop reason: HOSPADM

## 2025-01-12 RX ORDER — ONDANSETRON 4 MG/1
4 TABLET, ORALLY DISINTEGRATING ORAL EVERY 8 HOURS PRN
Status: DISCONTINUED | OUTPATIENT
Start: 2025-01-12 | End: 2025-01-13 | Stop reason: HOSPADM

## 2025-01-12 RX ORDER — MISOPROSTOL 200 UG/1
800 TABLET ORAL ONCE AS NEEDED
Status: DISCONTINUED | OUTPATIENT
Start: 2025-01-12 | End: 2025-01-12 | Stop reason: HOSPADM

## 2025-01-12 RX ORDER — HYDROCORTISONE 25 MG/G
CREAM TOPICAL AS NEEDED
Status: DISCONTINUED | OUTPATIENT
Start: 2025-01-12 | End: 2025-01-13 | Stop reason: HOSPADM

## 2025-01-12 RX ORDER — HYDROCODONE BITARTRATE AND ACETAMINOPHEN 5; 325 MG/1; MG/1
1 TABLET ORAL EVERY 4 HOURS PRN
Status: DISCONTINUED | OUTPATIENT
Start: 2025-01-12 | End: 2025-01-13 | Stop reason: HOSPADM

## 2025-01-12 RX ORDER — METHYLERGONOVINE MALEATE 0.2 MG/ML
200 INJECTION INTRAVENOUS ONCE AS NEEDED
Status: COMPLETED | OUTPATIENT
Start: 2025-01-12 | End: 2025-01-12

## 2025-01-12 RX ORDER — IBUPROFEN 600 MG/1
600 TABLET, FILM COATED ORAL EVERY 6 HOURS PRN
Status: DISCONTINUED | OUTPATIENT
Start: 2025-01-12 | End: 2025-01-13 | Stop reason: HOSPADM

## 2025-01-12 RX ORDER — PROMETHAZINE HYDROCHLORIDE 12.5 MG/1
12.5 TABLET ORAL EVERY 4 HOURS PRN
Status: DISCONTINUED | OUTPATIENT
Start: 2025-01-12 | End: 2025-01-13 | Stop reason: HOSPADM

## 2025-01-12 RX ORDER — OXYTOCIN/0.9 % SODIUM CHLORIDE 30/500 ML
125 PLASTIC BAG, INJECTION (ML) INTRAVENOUS ONCE AS NEEDED
Status: COMPLETED | OUTPATIENT
Start: 2025-01-12 | End: 2025-01-12

## 2025-01-12 RX ORDER — OXYTOCIN/0.9 % SODIUM CHLORIDE 30/500 ML
999 PLASTIC BAG, INJECTION (ML) INTRAVENOUS ONCE
Status: COMPLETED | OUTPATIENT
Start: 2025-01-12 | End: 2025-01-12

## 2025-01-12 RX ORDER — OXYTOCIN/0.9 % SODIUM CHLORIDE 30/500 ML
250 PLASTIC BAG, INJECTION (ML) INTRAVENOUS CONTINUOUS
Status: DISPENSED | OUTPATIENT
Start: 2025-01-12 | End: 2025-01-12

## 2025-01-12 RX ORDER — TRANEXAMIC ACID 10 MG/ML
1000 INJECTION, SOLUTION INTRAVENOUS ONCE AS NEEDED
Status: COMPLETED | OUTPATIENT
Start: 2025-01-12 | End: 2025-01-12

## 2025-01-12 RX ORDER — CARBOPROST TROMETHAMINE 250 UG/ML
250 INJECTION, SOLUTION INTRAMUSCULAR
Status: DISCONTINUED | OUTPATIENT
Start: 2025-01-12 | End: 2025-01-12 | Stop reason: HOSPADM

## 2025-01-12 RX ADMIN — SODIUM CHLORIDE, POTASSIUM CHLORIDE, SODIUM LACTATE AND CALCIUM CHLORIDE 125 ML/HR: 600; 310; 30; 20 INJECTION, SOLUTION INTRAVENOUS at 03:30

## 2025-01-12 RX ADMIN — Medication 250 ML/HR: at 04:31

## 2025-01-12 RX ADMIN — Medication 125 ML/HR: at 05:18

## 2025-01-12 RX ADMIN — METHYLERGONOVINE MALEATE 200 MCG: 0.2 INJECTION, SOLUTION INTRAMUSCULAR; INTRAVENOUS at 04:16

## 2025-01-12 RX ADMIN — TRANEXAMIC ACID 1000 MG: 10 INJECTION, SOLUTION INTRAVENOUS at 04:18

## 2025-01-12 RX ADMIN — Medication 999 ML/HR: at 04:14

## 2025-01-12 NOTE — LACTATION NOTE
This note was copied from a baby's chart.  P1 term baby, 6 hrs old. Mom reports baby nursed well after delivery. She attempted breast feeding again but baby was sleepy then she pumped and RN helped her give 3mls to baby. She has more EBM at bedside.   Discussed milk production, how to know baby is getting enough milk, feeding cues, expected output, STS, nursing on demand or every 3hrs, continue pumping every 3hrs if baby is too sleepy for breast feeding and encouraged to call for any assistance.   Mom has placed order for personal breast pump.

## 2025-01-12 NOTE — H&P
[facility]  Obstetric History and Physical    Chief Complaint   Patient presents with    Rupture of Membranes     Water broke around 1900. Clear fluid. Contractions every 3 minutes throughout the day. Spotting noted.       Subjective     Patient is a 24 y.o. female  currently at 39w1d, who presents with active labor with rupture of membranes of clear fluid.    Her prenatal care is benign.  Her previous obstetric/gynecological history is noted for is non-contributory.    The following portions of the patients history were reviewed and updated as appropriate: current medications, allergies, past medical history, past surgical history, past family history, past social history, and problem list .       Prenatal Information:  Prenatal Results       Initial Prenatal Labs       Test Value Reference Range Date Time    Hemoglobin  12.3 g/dL 11.1 - 15.9 24 1028    Hematocrit  37.7 % 34.0 - 46.6 24 1028    Platelets  265 x10E3/uL 150 - 450 24 1028    Rubella IgG  1.26 index Immune >0.99 24 1028    Hepatitis B SAg  Negative  Negative 24 1028    Hepatitis C Ab        RPR  Non Reactive  Non Reactive 24 1028    T. Pallidum Ab   Non Reactive  Non Reactive 24 1047    ABO  AB   24 1028    Rh  Positive   24 1028    Antibody Screen  Negative  Negative 24 1028    HIV  Non Reactive  Non Reactive 24 1028    Urine Culture  Final report   24 1417    Gonorrhea  Negative  Negative 24 1318    Chlamydia  Negative  Negative 24 1318    TSH        HgB A1c   4.7 % 4.8 - 5.6 24 1028    Varicella IgG  258 index Immune >165 24 1028    Hemoglobinopathy Fractionation        Hemoglobinopathy (genetic testing)        Cystic fibrosis         Spinal muscular atrophy        Fragile X                  Fetal testing        Test Value Reference Range Date Time    NIPT        MSAFP        AFP-4                  2nd and 3rd Trimester       Test Value Reference  Range Date Time    Hemoglobin (repeated)  11.5 g/dL 12.0 - 15.9 11/05/24 1047    Hematocrit (repeated)  35.0 % 34.0 - 46.6 11/05/24 1047    Platelets   244 10*3/mm3 140 - 450 11/05/24 1047       265 x10E3/uL 150 - 450 09/03/24 1028    1 hour GTT   157 mg/dL 65 - 139 11/05/24 1047    Antibody Screen (repeated)        3rd TM syphilis scrn (repeated)  RPR         3rd TM syphilis scrn (repeated) TP-Ab  Non Reactive  Non Reactive 11/05/24 1047    3rd TM syphilis screen TB-Ab (FTA)  Non Reactive  Non Reactive 11/05/24 1047    Syphilis cascade test TP-Ab (EIA)        Syphilis cascade TPPA        GTT Fasting  75 mg/dL 70 - 94 11/15/24 0904    GTT 1 Hr  160 mg/dL 70 - 179 11/15/24 0904    GTT 2 Hr  131 mg/dL 70 - 154 11/15/24 0904    GTT 3 Hr  60 mg/dL 70 - 139 11/15/24 0904    Group B Strep  Negative  Negative 12/26/24 1638              Other testing        Test Value Reference Range Date Time    Parvo IgG         CMV IgG                   Drug Screening       Test Value Reference Range Date Time    Amphetamine Screen        Barbiturate Screen        Benzodiazepine Screen        Methadone Screen        Phencyclidine Screen        Opiates Screen        THC Screen        Cocaine Screen        Propoxyphene Screen        Buprenorphine Screen        Methamphetamine Screen        Oxycodone Screen        Tricyclic Antidepressants Screen                  Legend    ^: Historical                          External Prenatal Results       Pregnancy Outside Results - Transcribed From Office Records - See Scanned Records For Details       Test Value Date Time    ABO  AB  09/03/24 1028    Rh  Positive  09/03/24 1028    Antibody Screen  Negative  09/03/24 1028    Varicella IgG  258 index 09/03/24 1028    Rubella  1.26 index 09/03/24 1028    Hgb  11.5 g/dL 11/05/24 1047       12.3 g/dL 09/03/24 1028    Hct  35.0 % 11/05/24 1047       37.7 % 09/03/24 1028    HgB A1c   4.7 % 09/03/24 1028    1h GTT  157 mg/dL 11/05/24 1047    3h GTT Fasting   75 mg/dL 11/15/24 0904    3h GTT 1 hour  160 mg/dL 11/15/24 0904    3h GTT 2 hour  131 mg/dL 11/15/24 0904    3h GTT 3 hour   60 mg/dL 11/15/24 0904    Gonorrhea (discrete)  Negative  24 1318    Chlamydia (discrete)  Negative  24 1318    RPR  Non Reactive  24 1028    Syphils cascade: TP-Ab (FTA)  Non Reactive  24 1047    TP-Ab  Non Reactive  24 1047    TP-Ab (EIA)       TPPA       HBsAg  Negative  24 1028    Herpes Simplex Virus PCR       Herpes Simplex VIrus Culture       HIV  Non Reactive  24 1028    Hep C RNA Quant PCR       Hep C Antibody       AFP       NIPT       Cystic Fibrosis ([place])       Cystic Fibroisis        Spinal Muscular atrophy       Fragile X       Group B Strep  Negative  24 1638    GBS Susceptibility to Clindamycin       GBS Susceptibility to Erythromycin       Fetal Fibronectin       Genetic Testing, Maternal Blood                 Drug Screening       Test Value Date Time    Urine Drug Screen       Amphetamine Screen       Barbiturate Screen       Benzodiazepine Screen       Methadone Screen       Phencyclidine Screen       Opiates Screen       THC Screen       Cocaine Screen       Propoxyphene Screen       Buprenorphine Screen       Methamphetamine Screen       Oxycodone Screen       Tricyclic Antidepressants Screen                 Legend    ^: Historical                             Past OB History:     OB History    Para Term  AB Living   1 0 0 0 0 0   SAB IAB Ectopic Molar Multiple Live Births   0 0 0 0 0 0      # Outcome Date GA Lbr Harrison/2nd Weight Sex Type Anes PTL Lv   1 Current                Past Medical History: Past Medical History:   Diagnosis Date    Pregnancy 2025    Vegetarian diet       Past Surgical History Past Surgical History:   Procedure Laterality Date    TONSILLECTOMY  2013    WISDOM TOOTH EXTRACTION  2018      Family History: Family History   Problem Relation Age of Onset    Hypertension Mother      Pancreatic cancer Paternal Grandfather 74        noted as genetic, father has gene as well but no cancer    Breast cancer Maternal Grandmother 65        Did not test for the gene, was ruled lifestyle induced.    Stroke Maternal Grandmother         Lifestyle induced.    Uterine cancer Neg Hx     Ovarian cancer Neg Hx     Colon cancer Neg Hx     Prostate cancer Neg Hx       Social History:  reports that she has never smoked. She has never used smokeless tobacco.   reports no history of alcohol use.   reports no history of drug use.        General ROS: Pertinent items are noted in HPI, all other systems reviewed and negative    Objective       Vital Signs Range for the last 24 hours  Temperature:     Temp Source:     BP: BP: (125-148)/(70-85) 148/85   Pulse: Heart Rate:  [] 107   Respirations: Resp:  [16] 16   SPO2: SpO2:  [97 %-99 %] 99 %   O2 Amount (l/min):     O2 Devices Device (Oxygen Therapy): room air   Weight: Weight:  [72.4 kg (159 lb 9.6 oz)-72.8 kg (160 lb 6.4 oz)] 72.8 kg (160 lb 6.4 oz)     Physical Examination: General appearance - alert, well appearing, and in no distress  Neck - supple, no significant adenopathy  Chest - no tachypnea, retractions or cyanosis  Heart - normal rate and regular rhythm  Abdomen -gravid, nontender  Pelvic -70/2/-2  Neurological - alert, oriented, normal speech, no focal findings or movement disorder noted  Extremities - peripheral pulses normal, no pedal edema, no clubbing or cyanosis  Skin - normal coloration and turgor, no rashes, no suspicious skin lesions noted    Presentation: vertex   Cervix: Exam by: Method: sterile vaginal exam performed   Dilation: Cervical Dilation (cm): 2   Effacement: Cervical Effacement: 70   Station:         Fetal Heart Rate Assessment   Method: Fetal HR Assessment Method: external   Beats/min: Fetal HR (beats/min): 140   Baseline: Fetal HR Baseline: normal range   Variability: Fetal HR Variability: moderate (amplitude range 6 to 25 bpm)    Accels: Fetal HR Accelerations: greater than/equal to 15 bpm, lasting at least 15 seconds   Decels: Fetal HR Decelerations: absent   Tracing Category:       Uterine Assessment   Method: Method: external tocotransducer   Frequency (min): Contraction Frequency (Minutes): None traced   Ctx Count in 10 min:     Duration:     Intensity: Contraction Intensity: no contractions   Intensity by IUPC:     Resting Tone: Uterine Resting Tone: soft by palpation   Resting Tone by IUPC:     Strandquist Units:       Laboratory Results:   Lab Results (last 24 hours)       Procedure Component Value Units Date/Time    Urinalysis With Culture If Indicated - Urine, Clean Catch [[id number]]  (Abnormal) Collected: 01/11/25 2001    Specimen: Urine, Clean Catch Updated: 01/11/25 2020     Color, UA Yellow     Appearance, UA Cloudy     pH, UA 7.5     Specific Gravity, UA 1.019     Glucose, UA Negative     Ketones, UA Negative     Bilirubin, UA Negative     Blood, UA Trace     Protein, UA Negative     Leuk Esterase, UA Negative     Nitrite, UA Negative     Urobilinogen, UA 0.2 E.U./dL    Narrative:      In absence of clinical symptoms, the presence of pyuria, bacteria, and/or nitrites on the urinalysis result does not correlate with infection.    Urinalysis, Microscopic Only - Urine, Clean Catch [[id number]]  (Abnormal) Collected: 01/11/25 2001    Specimen: Urine, Clean Catch Updated: 01/11/25 2020     RBC, UA 0-2 /HPF      WBC, UA 3-5 /HPF      Bacteria, UA None Seen /HPF      Squamous Epithelial Cells, UA 0-2 /HPF      Hyaline Casts, UA None Seen /LPF      Methodology Automated Microscopy    Rapid Assay For ROM - Amniotic Fluid, Amniotic Sac [[id number]]  (Abnormal) Collected: 01/11/25 2001    Specimen: Amniotic Fluid from Amniotic Sac Updated: 01/11/25 2019     Rupture of Membranes Positive    Urine Culture - Urine, Urine, Clean Catch [[id number]] Collected: 01/11/25 2001    Specimen: Urine, Clean Catch Updated: 01/11/25 2015            Radiology Review:  growth 45%  Other Studies: none    Assessment & Plan       Pregnancy        Assessment:  1.  Intrauterine pregnancy at 39w1d gestation with reactive fetal status.    2.  labor  with ROM  3.  Obstetrical history significant for is non-contributory.  4.  GBS status:   Strep Gp B Culture   Date Value Ref Range Status   2024 Negative Negative Final     Comment:     Centers for Disease Control and Prevention (CDC) and American Congress  of Obstetricians and Gynecologists (ACOG) guidelines for prevention of   group B streptococcal (GBS) disease specify co-collection of  a vaginal and rectal swab specimen to maximize sensitivity of GBS  detection. Per the CDC and ACOG, swabbing both the lower vagina and  rectum substantially increases the yield of detection compared with  sampling the vagina alone.  Penicillin G, ampicillin, or cefazolin are indicated for intrapartum  prophylaxis of  GBS colonization. Reflex susceptibility  testing should be performed prior to use of clindamycin only on GBS  isolates from penicillin-allergic women who are considered a high risk  for anaphylaxis. Treatment with vancomycin without additional testing  is warranted if resistance to clindamycin is noted.         Plan:  1. fetal and uterine monitoring  continuously, expectant management, and labor augmentation  Pitocin if needed  2. Plan of care has been reviewed with patient and family and nursing  3.  Anticipate vaginal delivery      Diana Griffin MD  2025  20:43 EST

## 2025-01-12 NOTE — L&D DELIVERY NOTE
" [facility]   Vaginal Delivery Note    Patient Name: Aruna Graves  : 2000  MRN: [id number]    Date of Delivery: 2025    Diagnosis     Pre & Post-Delivery:  Intrauterine pregnancy at 39w2d  Labor status: Spontaneous Onset of Labor    Pregnancy             Problem List    Transfer to Postpartum     Review the Delivery Report for details.     Delivery     Delivery: Vaginal, Spontaneous    YOB: 2025   Time of Birth:  Gestational Age 4:11 AM  39w2d     Anesthesia: None    Delivering clinician:     Forceps?   No   Vacuum? No    Shoulder dystocia present: No        Delivery narrative:  The patient was admitted in labor with SROM. Her GBS status was negative. The FHTs were reassuring throughout the labor and delivery course.  She progressed along a normal labor curve to completely dilated.  The patient pushed for one hour for a spontaneous vaginal delivery. The infant was bulb suctioned on the perineum and again after delivery.  The infant had good tone and cry.  The infant was placed on mothers abdomen. Gases were not sent. Cord blood was obtained. The placenta did follow spontaneously. The uterus was explored. A first degree laceration was repaired in the usual fashion.  TXA and methergine given for mild uterine atony with good response. The patient tolerated the procedure well.  Mother and infant are recovering well at this time    QBL:          Infant     Findings: female infant     Infant observations: Weight: No birth weight on file.  Length:   in  Observations/Comments:  scale 1     Apgars: 9  @ 1 minute /    9  @ 5 minutes   Infant Name: Rahel     Placenta & Cord         Placenta delivered  Spontaneous at   2025  4:14 AM    Cord: 3 vessels present.   Nuchal Cord?  no   Cord blood obtained: Yes   Cord gases obtained:  No   Cord gas results: Venous:  No results found for: \"PHCVEN\", \"BECVEN\"    Arterial:  No results found for: \"PHCART\", \"BECART\"     Repair     Episiotomy: " None    No    Lacerations: Yes  Laceration Information  Laceration Repaired?   Perineal: 1st Yes   Periurethral:       Labial:       Sulcus:       Vaginal:       Cervical:         Suture used for repair: 3-0 Vicryl  Laceration Length for 3rd or 4th degree lacerations: no cm   Estimated Blood Loss:  300 cc     Quantitative Blood Loss:          Complications     none    Disposition     Mother to Mother Baby/Postpartum  in stable condition currently.  Baby to remains with mom  in stable condition currently.    Diana Griffin MD  01/12/25  04:32 EST         - - -

## 2025-01-12 NOTE — OBED NOTES
"[facility]  Aruna Graves  : 2000  MRN: [id number]  CSN: [id number]    OB ED Provider Note    Subjective   Chief Complaint   Patient presents with    Rupture of Membranes     Water broke around 1900. Clear fluid. Contractions every 3 minutes throughout the day. Spotting noted.     Aruna Graves is a 24 y.o. year old  with an Estimated Date of Delivery: 25 currently at 39w1d presenting with SROM of clear fluid at 1900. Her CTX frequency has increased to every 2-3 min. She denies VB. FM is present.    Prenatal care has been with Dr. Drew.  It has been complicated by abnormal 1 hour OGTT with normal 3 hour OGTT .    OB History    Para Term  AB Living   1 0 0 0 0 0   SAB IAB Ectopic Molar Multiple Live Births   0 0 0 0 0 0      # Outcome Date GA Lbr Harrison/2nd Weight Sex Type Anes PTL Lv   1 Current              Past Medical History:   Diagnosis Date    Vegetarian diet      Past Surgical History:   Procedure Laterality Date    TONSILLECTOMY  2013    WISDOM TOOTH EXTRACTION       No current facility-administered medications for this encounter.    No Known Allergies  Social History    Tobacco Use      Smoking status: Never      Smokeless tobacco: Never    Review of Systems   Gastrointestinal:  Positive for abdominal pain.   Genitourinary:  Positive for vaginal discharge.   All other systems reviewed and are negative.        Objective   /85 (BP Location: Right arm, Patient Position: Lying)   Pulse 109   Resp 16   Ht 172.7 cm (68\")   Wt 72.8 kg (160 lb 6.4 oz)   LMP 2024 (Exact Date)   SpO2 97%   BMI 24.39 kg/m²   General: well developed; well nourished  no acute distress  mentation appropriate   Abdomen: soft, non-tender; no masses  gravid    FHT's: reactive and category 1      Cervix: was checked (by RN): 2 cm / 70 % / -2   Presentation: cephalic   Contractions: Irregular but toco needs adjusting   Chest: Unlabored respirations    CV:  Mild tachycardia, RR   Ext:   " No C/C/E   Back: CVA tenderness is deferred bilateral        Prenatal Labs  Lab Results   Component Value Date    HGB 11.5 (L) 11/05/2024    RUBELLAABIGG 1.26 09/03/2024    HEPBSAG Negative 09/03/2024    ABSCRN Negative 09/03/2024    XMX5WGY3 Non Reactive 09/03/2024     (H) 11/05/2024    GGTFASTING 75 11/15/2024    IFT6MBBU 160 11/15/2024    AFI8UBBT 131 11/15/2024    KUB5YJZL 60 (L) 11/15/2024    STREPGPB Negative 12/26/2024    URINECX Final report 09/03/2024    CHLAMNAA Negative 09/03/2024    NGONORRHON Negative 09/03/2024       Current Labs Reviewed   UA:    Lab Results   Component Value Date    SQUAMEPIUA 0-2 01/11/2025    SPECGRAVUR 1.019 01/11/2025    KETONESU Negative 01/11/2025    BLOODU Trace (A) 01/11/2025    LEUKOCYTESUR Negative 01/11/2025    NITRITEU Negative 01/11/2025    RBCUA 0-2 01/11/2025    WBCUA 3-5 (A) 01/11/2025    BACTERIA None Seen 01/11/2025          Assessment   IUP at 39w1d  SROM- early active labor. GBS negative and would like to avoid an epidural or pitocin if possible.     Plan   Admit to L&D for anticipated delivery. Dr. Griffin was notified and is assuming management.    Sada Ivey MD  1/11/2025  20:18 EST

## 2025-01-12 NOTE — PLAN OF CARE
Problem: Adult Inpatient Plan of Care  Goal: Plan of Care Review  Outcome: Progressing  Flowsheets (Taken 2025 0643)  Progress: improving  Outcome Evaluation: Pt arrived through RUDY ruptured around 1900. NCB. RN coached pt through laboring techniques and pain management. Nitrous used for pain control.  at 0411. Bleeding controlled. QBL 81.5. Transefered to MB.  Plan of Care Reviewed With: patient  Goal: Patient-Specific Goal (Individualized)  Outcome: Progressing  Flowsheets (Taken 2025 0643)  Patient/Family-Specific Goals (Include Timeframe): Healthy mom and baby by discharge.  Individualized Care Needs: NCB, nitrous, no erythromycin or Hep B for baby, Delayed cord clamping until cord stops pulsating, only post partum oxytocin to be administered.  Anxieties, Fears or Concerns: First baby  Goal: Absence of Hospital-Acquired Illness or Injury  Outcome: Progressing  Intervention: Identify and Manage Fall Risk  Recent Flowsheet Documentation  Taken 2025 044 by Marie Logan RN  Safety Promotion/Fall Prevention: safety round/check completed  Taken 2025 by Marie Logan RN  Safety Promotion/Fall Prevention: safety round/check completed  Intervention: Prevent Skin Injury  Recent Flowsheet Documentation  Taken 2025 0445 by Marie Logan RN  Body Position: sitting up in bed  Taken 2025 by Marie Logan, RN  Body Position: sitting up in bed  Goal: Optimal Comfort and Wellbeing  Outcome: Progressing  Intervention: Monitor Pain and Promote Comfort  Recent Flowsheet Documentation  Taken 2025 0615 by Marie Logan RN  Pain Management Interventions: pain management plan reviewed with patient/caregiver  Taken 2025 0122 by Marie Logan, RN  Pain Management Interventions: (nitrous) breathing exercises  Taken 2025 2355 by Marie Logan, RN  Pain Management Interventions:   breathing exercises   diversional activity provided   pain management plan reviewed with patient/caregiver   position  adjusted   quiet environment facilitated   relaxation techniques promoted  Taken 2025 by Marie Logan, RN  Pain Management Interventions:   breathing exercises   diversional activity provided  Intervention: Provide Person-Centered Care  Recent Flowsheet Documentation  Taken 2025 by Marie Logan, RN  Trust Relationship/Rapport:   care explained   choices provided   emotional support provided   empathic listening provided   questions answered   questions encouraged   reassurance provided   thoughts/feelings acknowledged  Taken 2025 by Marie Logan RN  Trust Relationship/Rapport:   care explained   choices provided   emotional support provided   empathic listening provided   questions answered   questions encouraged   reassurance provided   thoughts/feelings acknowledged  Goal: Readiness for Transition of Care  Outcome: Progressing  Intervention: Mutually Develop Transition Plan  Recent Flowsheet Documentation  Taken 2025 by Marie Logan, RN  Equipment Currently Used at Home: none  Taken 2025 by Marie Logan, RN  Equipment Needed After Discharge: none  Equipment Currently Used at Home: none  Anticipated Changes Related to Illness: none  Transportation Anticipated: family or friend will provide  Transportation Concerns: none  Concerns to be Addressed: no discharge needs identified  Readmission Within the Last 30 Days: no previous admission in last 30 days  Patient/Family Anticipated Services at Transition: none  Patient/Family Anticipates Transition to: home with family   Goal Outcome Evaluation:  Plan of Care Reviewed With: patient        Progress: improving  Outcome Evaluation: Pt arrived through RUDY ruptured around 1900. NCB. RN coached pt through laboring techniques and pain management. Nitrous used for pain control.  at 0411. Bleeding controlled. QBL 81.5. Transefered to MB.

## 2025-01-13 VITALS
BODY MASS INDEX: 24.31 KG/M2 | TEMPERATURE: 97.6 F | RESPIRATION RATE: 18 BRPM | OXYGEN SATURATION: 100 % | HEART RATE: 98 BPM | SYSTOLIC BLOOD PRESSURE: 110 MMHG | HEIGHT: 68 IN | WEIGHT: 160.4 LBS | DIASTOLIC BLOOD PRESSURE: 71 MMHG

## 2025-01-13 LAB
BACTERIA SPEC AEROBE CULT: NO GROWTH
BASOPHILS # BLD AUTO: 0.03 10*3/MM3 (ref 0–0.2)
BASOPHILS NFR BLD AUTO: 0.2 % (ref 0–1.5)
DEPRECATED RDW RBC AUTO: 40.2 FL (ref 37–54)
EOSINOPHIL # BLD AUTO: 0.05 10*3/MM3 (ref 0–0.4)
EOSINOPHIL NFR BLD AUTO: 0.3 % (ref 0.3–6.2)
ERYTHROCYTE [DISTWIDTH] IN BLOOD BY AUTOMATED COUNT: 12 % (ref 12.3–15.4)
HCT VFR BLD AUTO: 28.6 % (ref 34–46.6)
HGB BLD-MCNC: 9.1 G/DL (ref 12–15.9)
IMM GRANULOCYTES # BLD AUTO: 0.06 10*3/MM3 (ref 0–0.05)
IMM GRANULOCYTES NFR BLD AUTO: 0.4 % (ref 0–0.5)
LYMPHOCYTES # BLD AUTO: 2.71 10*3/MM3 (ref 0.7–3.1)
LYMPHOCYTES NFR BLD AUTO: 17.2 % (ref 19.6–45.3)
MCH RBC QN AUTO: 29.4 PG (ref 26.6–33)
MCHC RBC AUTO-ENTMCNC: 31.8 G/DL (ref 31.5–35.7)
MCV RBC AUTO: 92.3 FL (ref 79–97)
MONOCYTES # BLD AUTO: 0.74 10*3/MM3 (ref 0.1–0.9)
MONOCYTES NFR BLD AUTO: 4.7 % (ref 5–12)
NEUTROPHILS NFR BLD AUTO: 12.13 10*3/MM3 (ref 1.7–7)
NEUTROPHILS NFR BLD AUTO: 77.2 % (ref 42.7–76)
NRBC BLD AUTO-RTO: 0 /100 WBC (ref 0–0.2)
PLATELET # BLD AUTO: 236 10*3/MM3 (ref 140–450)
PMV BLD AUTO: 9.6 FL (ref 6–12)
RBC # BLD AUTO: 3.1 10*6/MM3 (ref 3.77–5.28)
WBC NRBC COR # BLD AUTO: 15.72 10*3/MM3 (ref 3.4–10.8)

## 2025-01-13 PROCEDURE — 85025 COMPLETE CBC W/AUTO DIFF WBC: CPT | Performed by: OBSTETRICS & GYNECOLOGY

## 2025-01-13 PROCEDURE — 0503F POSTPARTUM CARE VISIT: CPT | Performed by: OBSTETRICS & GYNECOLOGY

## 2025-01-13 RX ORDER — IBUPROFEN 800 MG/1
800 TABLET, FILM COATED ORAL EVERY 8 HOURS PRN
Qty: 30 TABLET | Refills: 1 | Status: SHIPPED | OUTPATIENT
Start: 2025-01-13

## 2025-01-13 NOTE — LACTATION NOTE
This note was copied from a baby's chart.  Mom decided to get the Zomee pump from [employer] , so order faxed. She is ready to go so  called and talked to LILLIAN. She said PT is not eligible for any pump with the insurance listed. So Medela pump, that we have extra in the office given to mom ss a gift.

## 2025-01-13 NOTE — PROGRESS NOTES
"Discharge Planning Assessment  [facility]     Patient Name: Aruna Graves  MRN: [id number]  Today's Date: 1/13/2025    Admit Date: 1/11/2025    Plan: Infant may discharge to mother when medically ready; CSW will follow cord tox. ELANA Chappell.   Discharge Needs Assessment    No documentation.                  Discharge Plan       Row Name 01/13/25 1259       Plan    Plan Infant may discharge to mother when medically ready; CSW will follow cord tox. ELANA Chappell.    Plan Comments Mother: Aruna Graves, MRN: [id number]; infant: FallpaigeGirl \"[place]\" Star, MRN: [id number]. CSW consulted for \"late prenatal care-24wks.\" Of note, mother's UDS was not collected on admit. Infant's UDS was missed; cord toxicology sent. CSW met with mother alone at bedside. Mother verified address, phone number, and insurance. Mother reports she understands the process of adding infant to health insurance. Mother reports having a car seat, crib/bassinet, clothes, and diapers for infant. This is mother and father's first baby. Mother reports, maternal grandparents, paternal grandparents, maternal siblings, paternal siblings, father of infant/, and other family members are available for support as needed. Mother reports infant is following up with [facility] after discharge; mother is comfortable scheduling appointments for infant and has reliable transportation. Mother is not current with [facility] but is familiar with the program. Mother denies any violence, threats, or feeling unsafe at home or relationship. CSW provided mother with a packet of resources including: WIC, HANDS, transportation, infant supplies, counseling, online support groups, postpartum mood and anxiety resources, and general community resources. CSW spent time building rapport with mother, and offered validation, support, and encouragement to mother throughout assessment. Mother was polite and appropriate, and denied having unmet needs or concerns at this " time. CSW will follow cord toxicology and complete mandated reporting to CPS if warranted. ELANA Chappell.                  Continued Care and Services - Admitted Since 1/11/2025    No active coordination exists for this encounter.          Demographic Summary       Row Name 01/13/25 1258       General Information    Admission Type inpatient    Arrived From home    Referral Source nursing    Reason for Consult other (see comments)    General Information Comments Late prenatal care                   Functional Status       Row Name 01/13/25 1259       Functional Status, IADL    Medications independent    Meal Preparation independent    Housekeeping independent    Laundry independent    Shopping independent       Mental Status    General Appearance WDL WDL       Mental Status Summary    Recent Changes in Mental Status/Cognitive Functioning no changes                   Psychosocial       Row Name 01/13/25 1259       Behavior WDL    Behavior WDL WDL       Emotion Mood WDL    Emotion/Mood/Affect WDL WDL       Speech WDL    Speech WDL WDL       Perceptual State WDL    Perceptual State WDL WDL       Thought Process WDL    Thought Process WDL WDL       Intellectual Performance WDL    Intellectual Performance WDL WDL       Coping/Stress    Major Change/Loss/Stressor birth    Patient Personal Strengths future/goal oriented;motivated;positive attitude;strong support system    Sources of Support other family members;parent(s);sibling(s);spouse                   Abuse/Neglect       Row Name 01/13/25 1259       Personal Safety    Feels Unsafe at Home or Work/School no    Feels Threatened by Someone no    Does Anyone Try to Keep You From Having Contact with Others or Doing Things Outside Your Home? no    Physical Signs of Abuse Present no                   Legal    No documentation.                  Substance Abuse       Row Name 01/13/25 1259       Substance Use    Substance Use Comment No UDS mom or infant; cord tox sent.                    Patient Forms    No documentation.                     JUAN DAVID Arizmendi

## 2025-01-13 NOTE — PLAN OF CARE
Problem: Adult Inpatient Plan of Care  Goal: Plan of Care Review  Outcome: Progressing  Flowsheets (Taken 1/13/2025 0059)  Progress: improving  Outcome Evaluation: vss, bleeding to a minimum, voiding spontaneously, up adlib, pain well controlled, working on breastfeeding  Plan of Care Reviewed With:   patient   significant other  Goal: Patient-Specific Goal (Individualized)  Outcome: Progressing  Goal: Absence of Hospital-Acquired Illness or Injury  Outcome: Progressing  Intervention: Identify and Manage Fall Risk  Recent Flowsheet Documentation  Taken 1/13/2025 0000 by Tameka Verdin RN  Safety Promotion/Fall Prevention: safety round/check completed  Taken 1/12/2025 2245 by Tameka Verdin RN  Safety Promotion/Fall Prevention: safety round/check completed  Taken 1/12/2025 2058 by Tameka Verdin RN  Safety Promotion/Fall Prevention: safety round/check completed  Intervention: Prevent Skin Injury  Recent Flowsheet Documentation  Taken 1/12/2025 2058 by Tameka Verdin, RN  Body Position: position changed independently  Goal: Optimal Comfort and Wellbeing  Outcome: Progressing  Intervention: Provide Person-Centered Care  Recent Flowsheet Documentation  Taken 1/12/2025 2058 by Tameka Verdin RN  Trust Relationship/Rapport:   choices provided   care explained  Goal: Readiness for Transition of Care  Outcome: Progressing   Goal Outcome Evaluation:  Plan of Care Reviewed With: patient, significant other        Progress: improving  Outcome Evaluation: vss, bleeding to a minimum, voiding spontaneously, up adlib, pain well controlled, working on breastfeeding

## 2025-01-13 NOTE — PROGRESS NOTES
[facility]   Obstetrics and Gynecology     2025    Name:Aruna Graves   MR#:[id number]    Vaginal Delivery Progress Note    HD#2    Subjective   Postpartum Day 1: 24 y.o. female  delivered at 39w2d  delivered a female infant.     The patient feels well.  Her pain is controlled.    She is ambulating well.  Patient describes her bleeding as thin lochia.    Breastfeeding/bottle:  The patient is currently breastfeeding.    Patient Active Problem List   Diagnosis    Supervision of normal first pregnancy, antepartum    Vegetarian    Late prenatal care    Abnormal glucose in pregnancy, antepartum    Pregnancy       Objective   Vital Signs Range for the last 24 hours  Temp: Temp:  [97.5 °F (36.4 °C)-97.8 °F (36.6 °C)] 97.6 °F (36.4 °C) Temp src: Oral   BP: BP: (110-117)/(70-73) 110/71        Pulse: Heart Rate:  [] 98  RR: Resp:  [16-18] 18  Weight: 72.8 kg (160 lb 6.4 oz)  BMI:  Body mass index is 24.39 kg/m².    Results from last 7 days   Lab Units 25  0701 25  2157   WBC 10*3/mm3 15.72* 12.83*   HEMOGLOBIN g/dL 9.1* 11.2*   HEMATOCRIT % 28.6* 32.0*   PLATELETS 10*3/mm3 236 268     Results from last 7 days   Lab Units 25  2157   SODIUM mmol/L 135*   POTASSIUM mmol/L 4.1   CHLORIDE mmol/L 104   CO2 mmol/L 19.0*   BUN mg/dL 8   CREATININE mg/dL 0.61   CALCIUM mg/dL 9.3   ALK PHOS U/L 129*   ALT (SGPT) U/L 15   AST (SGOT) U/L 20   GLUCOSE mg/dL 88       Physical Exam  Vitals and nursing note reviewed.   Constitutional:       General: She is not in acute distress.     Appearance: Normal appearance. She is well-developed. She is not ill-appearing.   HENT:      Head: Normocephalic.   Pulmonary:      Effort: Pulmonary effort is normal.   Abdominal:      General: Abdomen is flat. There is no distension.      Palpations: Abdomen is soft. There is no mass.      Tenderness: There is no abdominal tenderness.   Genitourinary:     Vagina: Normal.      Comments: Lochia is scant  Fundus  is firm   Musculoskeletal:         General: No swelling or tenderness.      Comments: No calf tenderness or swelling    Neurological:      Mental Status: She is alert and oriented to person, place, and time.   Psychiatric:         Behavior: Behavior normal.         Thought Content: Thought content normal.         Judgment: Judgment normal.         Assessment/Plan   1.  PPD# 1    Pregnancy      Plan:   Continue routine postpartum care  Plan discharge tomorrow    Virgilio Boyle MD  1/13/2025 11:32 EST

## 2025-01-13 NOTE — PAYOR COMM NOTE
"Sharlene Lassiter (24 y.o. Female)       Date of Birth   2000    Social Security Number       Address   [address] [address]    Home Phone   [phone]    MRN   [id number]       Zoroastrianism   Patient Refused    Marital Status                               Admission Date   1/11/25    Admission Type   Emergency    Admitting Provider   Diana Griffin MD    Attending Provider   Diana Griffin MD    Department, Room/Bed   [address], [address]/1       Discharge Date       Discharge Disposition       Discharge Destination                                 Attending Provider: Diana Griffin MD    Allergies: No Known Allergies    Isolation: None   Infection: None   Code Status: CPR    Ht: 172.7 cm (68\")   Wt: 72.8 kg (160 lb 6.4 oz)    Admission Cmt: None   Principal Problem: Pregnancy [Z34.90]                   Active Insurance as of 1/11/2025       Primary Coverage       Payor Plan Insurance Group Employer/Plan Group    [id number]       Payor Plan Address Payor Plan Phone Number Payor Plan Fax Number Effective Dates    [address] [address]   10/1/2022 - None Entered    [facility] [id number]         Subscriber Name Subscriber Birth Date Member ID       SHARLENE LASSITER 2000 [id number]                     Emergency Contacts        (Rel.) Home Phone Work Phone Mobile Phone    florencia lassiter (Spouse) [phone] -- [phone]              Insurance Information                  [facility] Practice Fusion/[employer] Phone: --    Subscriber: Sharlene Lassiter Subscriber#: [id number]    Group#: [id number] Precert#: --    Authorization#: SHARLENEBERUMEN1/13/2025 Effective Date: --          Problem List             Codes Noted - Resolved       Hospital    * (Principal) Pregnancy ICD-10-CM: Z34.90  ICD-9-CM: V22.2 1/11/2025 - Present       Non-Hospital    Abnormal glucose in pregnancy, antepartum ICD-10-CM: " O99.810  ICD-9-CM: 648.83 2024 - Present    Supervision of normal first pregnancy, antepartum ICD-10-CM: Z34.00  ICD-9-CM: V22.0 9/3/2024 - Present    Vegetarian ICD-10-CM: Z78.9  ICD-9-CM: V49.89 9/3/2024 - Present    Late prenatal care ICD-10-CM: O09.30  ICD-9-CM: V23.7 9/3/2024 - Present        History & Physical        Diana Griffin MD at 25          [facility]  Obstetric History and Physical    Chief Complaint   Patient presents with    Rupture of Membranes     Water broke around 1900. Clear fluid. Contractions every 3 minutes throughout the day. Spotting noted.       Subjective    Patient is a 24 y.o. female  currently at 39w1d, who presents with active labor with rupture of membranes of clear fluid.    Her prenatal care is benign.  Her previous obstetric/gynecological history is noted for is non-contributory.    The following portions of the patients history were reviewed and updated as appropriate: current medications, allergies, past medical history, past surgical history, past family history, past social history, and problem list .       Prenatal Information:  Prenatal Results       Initial Prenatal Labs       Test Value Reference Range Date Time    Hemoglobin  12.3 g/dL 11.1 - 15.9 24 1028    Hematocrit  37.7 % 34.0 - 46.6 24 1028    Platelets  265 x10E3/uL 150 - 450 24 1028    Rubella IgG  1.26 index Immune >0.99 24 1028    Hepatitis B SAg  Negative  Negative 24 1028    Hepatitis C Ab        RPR  Non Reactive  Non Reactive 24 1028    T. Pallidum Ab   Non Reactive  Non Reactive 24 1047    ABO  AB   24 1028    Rh  Positive   24 1028    Antibody Screen  Negative  Negative 24 1028    HIV  Non Reactive  Non Reactive 24 1028    Urine Culture  Final report   24 1417    Gonorrhea  Negative  Negative 24 1318    Chlamydia  Negative  Negative 24 1318    TSH        HgB A1c   4.7 % 4.8 - 5.6 24 1028     Varicella IgG  258 index Immune >165 09/03/24 1028    Hemoglobinopathy Fractionation        Hemoglobinopathy (genetic testing)        Cystic fibrosis         Spinal muscular atrophy        Fragile X                  Fetal testing        Test Value Reference Range Date Time    NIPT        MSAFP        AFP-4                  2nd and 3rd Trimester       Test Value Reference Range Date Time    Hemoglobin (repeated)  11.5 g/dL 12.0 - 15.9 11/05/24 1047    Hematocrit (repeated)  35.0 % 34.0 - 46.6 11/05/24 1047    Platelets   244 10*3/mm3 140 - 450 11/05/24 1047       265 x10E3/uL 150 - 450 09/03/24 1028    1 hour GTT   157 mg/dL 65 - 139 11/05/24 1047    Antibody Screen (repeated)        3rd TM syphilis scrn (repeated)  RPR         3rd TM syphilis scrn (repeated) TP-Ab  Non Reactive  Non Reactive 11/05/24 1047    3rd TM syphilis screen TB-Ab (FTA)  Non Reactive  Non Reactive 11/05/24 1047    Syphilis cascade test TP-Ab (EIA)        Syphilis cascade TPPA        GTT Fasting  75 mg/dL 70 - 94 11/15/24 0904    GTT 1 Hr  160 mg/dL 70 - 179 11/15/24 0904    GTT 2 Hr  131 mg/dL 70 - 154 11/15/24 0904    GTT 3 Hr  60 mg/dL 70 - 139 11/15/24 0904    Group B Strep  Negative  Negative 12/26/24 1638              Other testing        Test Value Reference Range Date Time    Parvo IgG         CMV IgG                   Drug Screening       Test Value Reference Range Date Time    Amphetamine Screen        Barbiturate Screen        Benzodiazepine Screen        Methadone Screen        Phencyclidine Screen        Opiates Screen        THC Screen        Cocaine Screen        Propoxyphene Screen        Buprenorphine Screen        Methamphetamine Screen        Oxycodone Screen        Tricyclic Antidepressants Screen                  Legend    ^: Historical                          External Prenatal Results       Pregnancy Outside Results - Transcribed From Office Records - See Scanned Records For Details       Test Value Date Time    ABO  AB   24 1028    Rh  Positive  24 1028    Antibody Screen  Negative  24 1028    Varicella IgG  258 index 24 1028    Rubella  1.26 index 24 1028    Hgb  11.5 g/dL 24 1047       12.3 g/dL 24 1028    Hct  35.0 % 24 1047       37.7 % 24 1028    HgB A1c   4.7 % 24 1028    1h GTT  157 mg/dL 24 1047    3h GTT Fasting  75 mg/dL 11/15/24 0904    3h GTT 1 hour  160 mg/dL 11/15/24 0904    3h GTT 2 hour  131 mg/dL 11/15/24 0904    3h GTT 3 hour   60 mg/dL 11/15/24 0904    Gonorrhea (discrete)  Negative  24 1318    Chlamydia (discrete)  Negative  24 1318    RPR  Non Reactive  24 1028    Syphils cascade: TP-Ab (FTA)  Non Reactive  24 1047    TP-Ab  Non Reactive  24 1047    TP-Ab (EIA)       TPPA       HBsAg  Negative  24 1028    Herpes Simplex Virus PCR       Herpes Simplex VIrus Culture       HIV  Non Reactive  24 1028    Hep C RNA Quant PCR       Hep C Antibody       AFP       NIPT       Cystic Fibrosis ([place])       Cystic Fibroisis        Spinal Muscular atrophy       Fragile X       Group B Strep  Negative  24 1638    GBS Susceptibility to Clindamycin       GBS Susceptibility to Erythromycin       Fetal Fibronectin       Genetic Testing, Maternal Blood                 Drug Screening       Test Value Date Time    Urine Drug Screen       Amphetamine Screen       Barbiturate Screen       Benzodiazepine Screen       Methadone Screen       Phencyclidine Screen       Opiates Screen       THC Screen       Cocaine Screen       Propoxyphene Screen       Buprenorphine Screen       Methamphetamine Screen       Oxycodone Screen       Tricyclic Antidepressants Screen                 Legend    ^: Historical                             Past OB History:     OB History    Para Term  AB Living   1 0 0 0 0 0   SAB IAB Ectopic Molar Multiple Live Births   0 0 0 0 0 0      # Outcome Date GA Lbr Harrison/2nd Weight Sex Type Anes  PTL Lv   1 Current                Past Medical History: Past Medical History:   Diagnosis Date    Pregnancy 1/11/2025    Vegetarian diet       Past Surgical History Past Surgical History:   Procedure Laterality Date    TONSILLECTOMY  2013    WISDOM TOOTH EXTRACTION  2018      Family History: Family History   Problem Relation Age of Onset    Hypertension Mother     Pancreatic cancer Paternal Grandfather 74        noted as genetic, father has gene as well but no cancer    Breast cancer Maternal Grandmother 65        Did not test for the gene, was ruled lifestyle induced.    Stroke Maternal Grandmother         Lifestyle induced.    Uterine cancer Neg Hx     Ovarian cancer Neg Hx     Colon cancer Neg Hx     Prostate cancer Neg Hx       Social History:  reports that she has never smoked. She has never used smokeless tobacco.   reports no history of alcohol use.   reports no history of drug use.        General ROS: Pertinent items are noted in HPI, all other systems reviewed and negative    Objective      Vital Signs Range for the last 24 hours  Temperature:     Temp Source:     BP: BP: (125-148)/(70-85) 148/85   Pulse: Heart Rate:  [] 107   Respirations: Resp:  [16] 16   SPO2: SpO2:  [97 %-99 %] 99 %   O2 Amount (l/min):     O2 Devices Device (Oxygen Therapy): room air   Weight: Weight:  [72.4 kg (159 lb 9.6 oz)-72.8 kg (160 lb 6.4 oz)] 72.8 kg (160 lb 6.4 oz)     Physical Examination: General appearance - alert, well appearing, and in no distress  Neck - supple, no significant adenopathy  Chest - no tachypnea, retractions or cyanosis  Heart - normal rate and regular rhythm  Abdomen -gravid, nontender  Pelvic -70/2/-2  Neurological - alert, oriented, normal speech, no focal findings or movement disorder noted  Extremities - peripheral pulses normal, no pedal edema, no clubbing or cyanosis  Skin - normal coloration and turgor, no rashes, no suspicious skin lesions noted    Presentation: vertex   Cervix: Exam by:  Method: sterile vaginal exam performed   Dilation: Cervical Dilation (cm): 2   Effacement: Cervical Effacement: 70   Station:         Fetal Heart Rate Assessment   Method: Fetal HR Assessment Method: external   Beats/min: Fetal HR (beats/min): 140   Baseline: Fetal HR Baseline: normal range   Variability: Fetal HR Variability: moderate (amplitude range 6 to 25 bpm)   Accels: Fetal HR Accelerations: greater than/equal to 15 bpm, lasting at least 15 seconds   Decels: Fetal HR Decelerations: absent   Tracing Category:       Uterine Assessment   Method: Method: external tocotransducer   Frequency (min): Contraction Frequency (Minutes): None traced   Ctx Count in 10 min:     Duration:     Intensity: Contraction Intensity: no contractions   Intensity by IUPC:     Resting Tone: Uterine Resting Tone: soft by palpation   Resting Tone by IUPC:     [place] Units:       Laboratory Results:   Lab Results (last 24 hours)       Procedure Component Value Units Date/Time    Urinalysis With Culture If Indicated - Urine, Clean Catch [[id number]]  (Abnormal) Collected: 01/11/25 2001    Specimen: Urine, Clean Catch Updated: 01/11/25 2020     Color, UA Yellow     Appearance, UA Cloudy     pH, UA 7.5     Specific Gravity, UA 1.019     Glucose, UA Negative     Ketones, UA Negative     Bilirubin, UA Negative     Blood, UA Trace     Protein, UA Negative     Leuk Esterase, UA Negative     Nitrite, UA Negative     Urobilinogen, UA 0.2 E.U./dL    Narrative:      In absence of clinical symptoms, the presence of pyuria, bacteria, and/or nitrites on the urinalysis result does not correlate with infection.    Urinalysis, Microscopic Only - Urine, Clean Catch [[id number]]  (Abnormal) Collected: 01/11/25 2001    Specimen: Urine, Clean Catch Updated: 01/11/25 2020     RBC, UA 0-2 /HPF      WBC, UA 3-5 /HPF      Bacteria, UA None Seen /HPF      Squamous Epithelial Cells, UA 0-2 /HPF      Hyaline Casts, UA None Seen /LPF      Methodology Automated  Microscopy    Rapid Assay For ROM - Amniotic Fluid, Amniotic Sac [[id number]]  (Abnormal) Collected: 25    Specimen: Amniotic Fluid from Amniotic Sac Updated: 25     Rupture of Membranes Positive    Urine Culture - Urine, Urine, Clean Catch [[id number]] Collected: 25    Specimen: Urine, Clean Catch Updated: 25           Radiology Review:  growth 45%  Other Studies: none    Assessment & Plan      Pregnancy        Assessment:  1.  Intrauterine pregnancy at 39w1d gestation with reactive fetal status.    2.  labor  with ROM  3.  Obstetrical history significant for is non-contributory.  4.  GBS status:   Strep Gp B Culture   Date Value Ref Range Status   2024 Negative Negative Final     Comment:     Centers for Disease Control and Prevention (CDC) and American Congress  of Obstetricians and Gynecologists (ACOG) guidelines for prevention of   group B streptococcal (GBS) disease specify co-collection of  a vaginal and rectal swab specimen to maximize sensitivity of GBS  detection. Per the CDC and ACOG, swabbing both the lower vagina and  rectum substantially increases the yield of detection compared with  sampling the vagina alone.  Penicillin G, ampicillin, or cefazolin are indicated for intrapartum  prophylaxis of  GBS colonization. Reflex susceptibility  testing should be performed prior to use of clindamycin only on GBS  isolates from penicillin-allergic women who are considered a high risk  for anaphylaxis. Treatment with vancomycin without additional testing  is warranted if resistance to clindamycin is noted.         Plan:  1. fetal and uterine monitoring  continuously, expectant management, and labor augmentation  Pitocin if needed  2. Plan of care has been reviewed with patient and family and nursing  3.  Anticipate vaginal delivery      Diana Griffin MD  2025  20:43 EST      Electronically signed by Diana Griffin MD at 25        Facility-Administered Medications as of 2025   Medication Dose Route Frequency Provider Last Rate Last Admin    acetaminophen (TYLENOL) tablet 650 mg  650 mg Oral Q6H PRN Diana Griffin MD        all purpose nipple ointment   Topical 4x Daily PRN Diana Griffin MD        benzocaine (AMERICAINE) 20 % rectal ointment   Rectal PRN Diana Griffin MD        benzocaine-menthol (DERMOPLAST) 20-0.5 % topical spray   Topical PRN Diana Griffin MD        bisacodyl (DULCOLAX) suppository 10 mg  10 mg Rectal Daily PRN Diana Griffin MD        calcium carbonate (TUMS) chewable tablet 500 mg (200 mg elemental)  2 tablet Oral TID PRN Diana Griffin MD        docusate sodium (COLACE) capsule 100 mg  100 mg Oral BID Diana Griffin MD        fentaNYL 2mcg/mL and ropivacaine 0.2% in NS epidural 100mL  10 mL/hr Epidural Continuous Rajendra Torre MD        HYDROcodone-acetaminophen (NORCO) 5-325 MG per tablet 1 tablet  1 tablet Oral Q4H PRN Diana Griffin MD        Or    HYDROcodone-acetaminophen (NORCO)  MG per tablet 1 tablet  1 tablet Oral Q4H PRN Diana Griffin MD        Hydrocort-Pramoxine (Perianal) (PROCTOFOAM-HS) 1-1 % rectal foam 1 Application  1 Application Topical PRN Diana Griffin MD        Hydrocortisone (Perianal) (ANUSOL-HC) 2.5 % rectal cream   Rectal PRN Diana Griffin MD        ibuprofen (ADVIL,MOTRIN) tablet 600 mg  600 mg Oral Q6H PRN Diana Griffin MD        [] lactated ringers bolus 1,000 mL  1,000 mL Intravenous Once PRN Diana Griffin MD        lactated ringers infusion  125 mL/hr Intravenous Continuous Diana Griffin  mL/hr at 25 0330 125 mL/hr at 25 0330    magnesium hydroxide (MILK OF MAGNESIA) suspension 10 mL  10 mL Oral Daily PRN Diana Griffin MD        [COMPLETED] methylergonovine (METHERGINE) injection 200 mcg  200 mcg Intramuscular Once PRN Diana Griffin MD   200 mcg at 25 0416    ondansetron ODT (ZOFRAN-ODT)  disintegrating tablet 4 mg  4 mg Oral Q8H PRN Diana Griffin MD        [COMPLETED] oxytocin (PITOCIN) 30 units in 0.9% sodium chloride 500 mL (premix)  999 mL/hr Intravenous Once Diana Griffin  mL/hr at 254 999 mL/hr at 25    Followed by    [] oxytocin (PITOCIN) 30 units in 0.9% sodium chloride 500 mL (premix)  250 mL/hr Intravenous Continuous Diana Griffin  mL/hr at 251 250 mL/hr at 25    [COMPLETED] oxytocin (PITOCIN) 30 units in 0.9% sodium chloride 500 mL (premix)  125 mL/hr Intravenous Once PRN Diana Griffin  mL/hr at 2518 125 mL/hr at 25    promethazine (PHENERGAN) tablet 12.5 mg  12.5 mg Oral Q4H PRN Diana Griffin MD        [COMPLETED] tranexamic acid 1000 mg in 100 mL 0.7% NaCl infusion (premix)  1,000 mg Intravenous Once PRN Diana Griffin MD   1,000 mg at 258     Lab Results (last 72 hours)       Procedure Component Value Units Date/Time    CBC & Differential [[id number]]  (Abnormal) Collected: 25    Specimen: Blood Updated: 25    Narrative:      The following orders were created for panel order CBC & Differential.  Procedure                               Abnormality         Status                     ---------                               -----------         ------                     CBC Auto Differential[[id number]]        Abnormal            Final result                 Please view results for these tests on the individual orders.    CBC Auto Differential [[id number]]  (Abnormal) Collected: 25    Specimen: Blood Updated: 25     WBC 15.72 10*3/mm3      RBC 3.10 10*6/mm3      Hemoglobin 9.1 g/dL      Hematocrit 28.6 %      MCV 92.3 fL      MCH 29.4 pg      MCHC 31.8 g/dL      RDW 12.0 %      RDW-SD 40.2 fl      MPV 9.6 fL      Platelets 236 10*3/mm3      Neutrophil % 77.2 %      Lymphocyte % 17.2 %      Monocyte % 4.7 %      Eosinophil % 0.3 %       Basophil % 0.2 %      Immature Grans % 0.4 %      Neutrophils, Absolute 12.13 10*3/mm3      Lymphocytes, Absolute 2.71 10*3/mm3      Monocytes, Absolute 0.74 10*3/mm3      Eosinophils, Absolute 0.05 10*3/mm3      Basophils, Absolute 0.03 10*3/mm3      Immature Grans, Absolute 0.06 10*3/mm3      nRBC 0.0 /100 WBC     Urine Culture - Urine, Urine, Clean Catch [[id number]]  (Normal) Collected: 01/11/25 2001    Specimen: Urine, Clean Catch Updated: 01/13/25 0444     Urine Culture No growth    Treponema pallidum AB w/Reflex RPR [[id number]]  (Normal) Collected: 01/11/25 2157    Specimen: Blood Updated: 01/11/25 2301     Treponemal AB Total Non-Reactive    Narrative:      Reactive results will reflex RPR testing.    Comprehensive Metabolic Panel [[id number]]  (Abnormal) Collected: 01/11/25 2157    Specimen: Blood Updated: 01/11/25 2252     Glucose 88 mg/dL      BUN 8 mg/dL      Creatinine 0.61 mg/dL      Sodium 135 mmol/L      Potassium 4.1 mmol/L      Chloride 104 mmol/L      CO2 19.0 mmol/L      Calcium 9.3 mg/dL      Total Protein 6.1 g/dL      Albumin 3.5 g/dL      ALT (SGPT) 15 U/L      AST (SGOT) 20 U/L      Alkaline Phosphatase 129 U/L      Total Bilirubin 0.3 mg/dL      Globulin 2.6 gm/dL      A/G Ratio 1.3 g/dL      BUN/Creatinine Ratio 13.1     Anion Gap 12.0 mmol/L      eGFR 128.2 mL/min/1.73     Narrative:      GFR Categories in Chronic Kidney Disease (CKD)      GFR Category          GFR (mL/min/1.73)    Interpretation  G1                     90 or greater         Normal or high (1)  G2                      60-89                Mild decrease (1)  G3a                   45-59                Mild to moderate decrease  G3b                   30-44                Moderate to severe decrease  G4                    15-29                Severe decrease  G5                    14 or less           Kidney failure          (1)In the absence of evidence of kidney disease, neither GFR category G1 or G2 fulfill the criteria for  CKD.    eGFR calculation 2021 CKD-EPI creatinine equation, which does not include race as a factor    CBC & Differential [[id number]]  (Abnormal) Collected: 01/11/25 2157    Specimen: Blood Updated: 01/11/25 2233    Narrative:      The following orders were created for panel order CBC & Differential.  Procedure                               Abnormality         Status                     ---------                               -----------         ------                     CBC Auto Differential[[id number]]        Abnormal            Final result                 Please view results for these tests on the individual orders.    CBC Auto Differential [[id number]]  (Abnormal) Collected: 01/11/25 2157    Specimen: Blood Updated: 01/11/25 2233     WBC 12.83 10*3/mm3      RBC 3.57 10*6/mm3      Hemoglobin 11.2 g/dL      Hematocrit 32.0 %      MCV 89.6 fL      MCH 31.4 pg      MCHC 35.0 g/dL      RDW 12.0 %      RDW-SD 38.9 fl      MPV 9.3 fL      Platelets 268 10*3/mm3      Neutrophil % 82.0 %      Lymphocyte % 12.3 %      Monocyte % 4.7 %      Eosinophil % 0.3 %      Basophil % 0.2 %      Immature Grans % 0.5 %      Neutrophils, Absolute 10.53 10*3/mm3      Lymphocytes, Absolute 1.58 10*3/mm3      Monocytes, Absolute 0.60 10*3/mm3      Eosinophils, Absolute 0.04 10*3/mm3      Basophils, Absolute 0.02 10*3/mm3      Immature Grans, Absolute 0.06 10*3/mm3      nRBC 0.0 /100 WBC     Urinalysis With Culture If Indicated - Urine, Clean Catch [[id number]]  (Abnormal) Collected: 01/11/25 2001    Specimen: Urine, Clean Catch Updated: 01/11/25 2020     Color, UA Yellow     Appearance, UA Cloudy     pH, UA 7.5     Specific Gravity, UA 1.019     Glucose, UA Negative     Ketones, UA Negative     Bilirubin, UA Negative     Blood, UA Trace     Protein, UA Negative     Leuk Esterase, UA Negative     Nitrite, UA Negative     Urobilinogen, UA 0.2 E.U./dL    Narrative:      In absence of clinical symptoms, the presence of pyuria, bacteria, and/or  nitrites on the urinalysis result does not correlate with infection.    Urinalysis, Microscopic Only - Urine, Clean Catch [[id number]]  (Abnormal) Collected: 01/11/25 2001    Specimen: Urine, Clean Catch Updated: 01/11/25 2020     RBC, UA 0-2 /HPF      WBC, UA 3-5 /HPF      Bacteria, UA None Seen /HPF      Squamous Epithelial Cells, UA 0-2 /HPF      Hyaline Casts, UA None Seen /LPF      Methodology Automated Microscopy    Rapid Assay For ROM - Amniotic Fluid, Amniotic Sac [[id number]]  (Abnormal) Collected: 01/11/25 2001    Specimen: Amniotic Fluid from Amniotic Sac Updated: 01/11/25 2019     Rupture of Membranes Positive          Imaging Results (Last 72 Hours)       ** No results found for the last 72 hours. **          ECG/EMG Results (last 72 hours)       ** No results found for the last 72 hours. **          Orders (last 72 hrs)        Start     Ordered    01/13/25 0800  Sitz Bath  3 Times Daily        Comments: PRN    01/12/25 0606    01/13/25 0430  CBC & Differential  Once        Comments: Postpartum Day 1      01/12/25 0606    01/13/25 0430  CBC Auto Differential  PROCEDURE ONCE        Comments: Postpartum Day 1      01/12/25 2030    01/13/25 0000  bisacodyl (DULCOLAX) suppository 10 mg  Daily PRN         01/12/25 0606    01/12/25 1012  Diet: Vegetarian; Lacto-Ovo Vegetarian (Allows dairy, eggs); Fluid Consistency: Thin (IDDSI 0)  Diet Effective Now         01/12/25 1011    01/12/25 0900  docusate sodium (COLACE) capsule 100 mg  2 Times Daily         01/12/25 0606    01/12/25 0607  Transfer to postpartum when discharge criteria met.  Until Discontinued         01/12/25 0606    01/12/25 0607  Code Status and Medical Interventions: CPR (Attempt to Resuscitate); Full  Continuous         01/12/25 0606    01/12/25 0607  Vital Signs Per hospital policy  Per Hospital Policy         01/12/25 0606    01/12/25 0607  Notify Physician  Until Discontinued         01/12/25 0606    01/12/25 0607  Up Ad Shani  Until  "Discontinued         01/12/25 0606 01/12/25 0607  Ambulate Patient  Every Shift       01/12/25 0606 01/12/25 0607  Diet: Regular/House; Fluid Consistency: Thin (IDDSI 0)  Diet Effective Now,   Status:  Canceled         01/12/25 0606    01/12/25 0607  Advance Diet As Tolerated -Regular  Until Discontinued,   Status:  Canceled         01/12/25 0606 01/12/25 0607  Fundal and Lochia Check  Per Hospital Policy        Comments: Q 15 min x 4, Q 30 min x 2, then Q Shift    01/12/25 0606 01/12/25 0607  RN to Assess Rh Status & Place RhIG Evaluation Order if Indicated  Continuous         01/12/25 0606 01/12/25 0607  Bladder Assessment  Per Order Details        Comments: Postpartum 1) Upon Admission to Unit & Every 4 Hours PRN Until Voiding. 2) Out of Bed to Void in 8 Hours.    01/12/25 0606 01/12/25 0607  Straight Cath  Per Order Details        Comments: Postpartum: If Distended & Unable to Void, May Repeat Once.    01/12/25 0606 01/12/25 0607  Indwelling Urinary Catheter  Per Order Details        Comments: Postpartum : After Straight Cathed x2 or if Greater Than 1000mL Residual, Insert Indwelling Urinary Catheter Until Further MD Order.    01/12/25 0606 01/12/25 0607  Breast pump to bed  Once         01/12/25 0606 01/12/25 0607  If indicated -- Please administer RH Immunoglobulin based on results of cord blood evaluation and fetal screen lab tests, pharmacy to dispense  Per Order Details        Comments: See Process Instructions For Reference Range Details.    01/12/25 0606 01/12/25 0606  ibuprofen (ADVIL,MOTRIN) tablet 600 mg  Every 6 Hours PRN         01/12/25 0606 01/12/25 0606  acetaminophen (TYLENOL) tablet 650 mg  Every 6 Hours PRN         01/12/25 0606 01/12/25 0606  HYDROcodone-acetaminophen (NORCO) 5-325 MG per tablet 1 tablet  Every 4 Hours PRN        Placed in \"Or\" Linked Group    01/12/25 0606 01/12/25 0606  HYDROcodone-acetaminophen (NORCO)  MG per tablet 1 tablet  " "Every 4 Hours PRN        Placed in \"Or\" Linked Group    01/12/25 0606    01/12/25 0606  magnesium hydroxide (MILK OF MAGNESIA) suspension 10 mL  Daily PRN         01/12/25 0606    01/12/25 0606  all purpose nipple ointment  4 Times Daily PRN         01/12/25 0606    01/12/25 0606  benzocaine-menthol (DERMOPLAST) 20-0.5 % topical spray  As Needed         01/12/25 0606    01/12/25 0606  Hydrocort-Pramoxine (Perianal) (PROCTOFOAM-HS) 1-1 % rectal foam 1 Application  As Needed         01/12/25 0606    01/12/25 0606  Hydrocortisone (Perianal) (ANUSOL-HC) 2.5 % rectal cream  As Needed         01/12/25 0606    01/12/25 0606  benzocaine (AMERICAINE) 20 % rectal ointment  As Needed         01/12/25 0606    01/12/25 0606  ondansetron ODT (ZOFRAN-ODT) disintegrating tablet 4 mg  Every 8 Hours PRN         01/12/25 0606    01/12/25 0606  promethazine (PHENERGAN) tablet 12.5 mg  Every 4 Hours PRN         01/12/25 0606    01/12/25 0606  calcium carbonate (TUMS) chewable tablet 500 mg (200 mg elemental)  3 Times Daily PRN         01/12/25 0606    01/12/25 0516  oxytocin (PITOCIN) 30 units in 0.9% sodium chloride 500 mL (premix)  Once As Needed         01/12/25 0516    01/12/25 0433  VTE Prophylaxis Not Indicated: Low Risk; No Risk Factors (0)  Once         01/12/25 0433    01/12/25 0400  oxytocin (PITOCIN) 30 units in 0.9% sodium chloride 500 mL (premix)  Continuous        Placed in \"Followed by\" Linked Group    01/12/25 0245    01/12/25 0345  oxytocin (PITOCIN) 30 units in 0.9% sodium chloride 500 mL (premix)  Once        Placed in \"Followed by\" Linked Group    01/12/25 0245    01/12/25 0246  Notify Provider (Specified)  Until Discontinued         01/12/25 0245    01/12/25 0246  Vital Signs Per Hospital Policy  Per Hospital Policy         01/12/25 0245    01/12/25 0246  Fundal & Lochia Check  Per Order Details        Comments: Every 15 Minutes x4, Then Every 30 Minutes x2, Then Every Shift    01/12/25 0245    01/12/25 0246  Diet: " Regular/House; Fluid Consistency: Thin (IDDSI 0)  Diet Effective Now,   Status:  Canceled         01/12/25 0245 01/12/25 0246  Advance Diet As Tolerated -  Until Discontinued,   Status:  Canceled         01/12/25 0245 01/12/25 0245  Fundal & Lochia Check  Every Shift       01/12/25 0245 01/12/25 0245  methylergonovine (METHERGINE) injection 200 mcg  Once As Needed         01/12/25 0245 01/12/25 0245  carboprost (HEMABATE) injection 250 mcg  Every 15 Minutes PRN,   Status:  Discontinued         01/12/25 0245 01/12/25 0245  miSOPROStol (CYTOTEC) tablet 800 mcg  Once As Needed,   Status:  Discontinued         01/12/25 0245 01/12/25 0245  tranexamic acid 1000 mg in 100 mL 0.7% NaCl infusion (premix)  Once As Needed         01/12/25 0245 01/11/25 2145  fentaNYL 2mcg/mL and ropivacaine 0.2% in NS epidural 100mL  Continuous         01/11/25 2057 01/11/25 2130  sodium chloride 0.9 % flush 10 mL  Every 12 Hours Scheduled,   Status:  Discontinued         01/11/25 2044 01/11/25 2130  lactated ringers infusion  Continuous         01/11/25 2044 01/11/25 2057  Vital Signs Per Anesthesia Guidelines  Per Order Details        Comments: Every 2 Minutes x5, Every 5 Minutes x4, Then If Stable Every 15 Minutes    01/11/25 2057 01/11/25 2057  Start IV with #16 or #18 gauge angiocath.  Once         01/11/25 2057 01/11/25 2057  Fetal Heart Rate Monitor  Once         01/11/25 2057 01/11/25 2057  Nurse or anesthesiologist to remain with patient for 15 minutes following dosing.  Until Discontinued         01/11/25 2057 01/11/25 2057  Facilitate maternal postion on side and maintain uterine displacement.  Until Discontinued         01/11/25 2057 01/11/25 2057  Consult anesthesia services prior to changing epidural infusion/rate.  Until Discontinued         01/11/25 2057 01/11/25 2057  Nurse may remove epidural catheter after delivery.  Until Discontinued         01/11/25 2057 01/11/25 2057   "Insert Indwelling Urinary Catheter  Once        Placed in \"And\" Linked Group    01/11/25 2057 01/11/25 2057  Assess Need for Indwelling Urinary Catheter - Follow Removal Protocol  Continuous        Comments: Indwelling Urinary Catheter Removal Criteria  Discontinue Indwelling Urinary Catheter Unless One of the Following is Present:  Urinary Retention or Obstruction  Chronic Urinary Catheter Use  End of Life  Critical Illness with Strict I/O   Tract or Abdominal Surgery  Stage 3/4 Sacral / Perineal Wound  Required Activity Restriction: Trauma  Required Activity Restriction: Spine Surgery  If Patient is Being Followed by Urology Contact Them PRIOR to Removal  Do Not Remove Indwelling Urinary Catheter Order is Present with a CLINICAL REASON to Maintain the Catheter. Provider is Required to Include a Clinical Reason to Maintain a Urinary Catheter    Patient Admitted With Indwelling Urinary Catheter (Not Placed at [facility] [place] [facility])  Assess for Continued Need & Document Medical Necessity  If Infection is Suspected, Contact the Provider       Placed in \"And\" Linked Group    01/11/25 2057 01/11/25 2057  Urinary Catheter Care  Every Shift,   Status:  Canceled      Placed in \"And\" Linked Group    01/11/25 2057 01/11/25 2057  Notify physician for the following conditions:  Until Discontinued         01/11/25 2057 01/11/25 2056  ondansetron (ZOFRAN) injection 4 mg  Once As Needed,   Status:  Discontinued         01/11/25 2057 01/11/25 2056  famotidine (PEPCID) injection 20 mg  Once As Needed,   Status:  Discontinued         01/11/25 2057 01/11/25 2056  diphenhydrAMINE (BENADRYL) injection 12.5 mg  Every 8 Hours PRN,   Status:  Discontinued         01/11/25 2057 01/11/25 2056  ePHEDrine injection 5 mg  Every 15 Minutes PRN,   Status:  Discontinued         01/11/25 2057 01/11/25 2045  Code Status and Medical Interventions: CPR (Attempt to Resuscitate); Full Support  Continuous,   Status:  Canceled      "    25  Obtain Informed Consent  Once         25  Vital Signs Per Hospital Policy  Per Hospital Policy         25  Mini-Prep Prior to Delivery  Once         25  Continuous Fetal Monitoring With NST on Admission and Prior to Initiation of Oxytocin.  Per Order Details        Comments: Continuous Fetal Monitoring With NST on Admission & Prior to Initiation of Oxytocin.    25  External Uterine Contraction Monitoring  Per Hospital Policy         25  Notify Provider (Specified)  Until Discontinued         25  Notify Provider of Tachysystole (Per Hospital Algorithm)  Until Discontinued         25  Notify Provider if Membranes Ruptured, Bleeding Greater Than 1 Pad Per Hour, Fetal Heart Tone Abnormality or Severe Pain  Until Discontinued         25  Initiate Group Beta Strep (GBS) Prophylaxis Protocol, If Criteria Met  Continuous        Comments: NO TREATMENT RECOMMENDED IF: 1) Maternal GBS Status Known Negative 2) Scheduled  Birth With Intact Membranes, Not in Labor 3) Maternal GBS Status Unknown, No Risk Factors  TREAT WITH ANTIBIOTICS IF:  1) Maternal GBS Status Known Positive 2) Maternal GBS Status Unknown With Risk Factors: a)  Previous Infant Affected By GBS Infection b) GBS Urinary Tract Infection (UTI) or Bacteriuria During Pregnancy c) Unexplained Maternal Fever (100.4F (38C) or Greater) During Labor d)  Prolonged Rupture of Membranes (18 or More Hours) e) Gestational Age Less Than 37 Weeks    25  Inpatient Anesthesiology Consult  Once        Specialty:  Anesthesiology  Provider:  (Not yet assigned)    25  Treponema pallidum AB w/Reflex RPR  Once         25  CBC & Differential   "Once         01/11/25 2044 01/11/25 2045  Comprehensive Metabolic Panel  Once         01/11/25 2044 01/11/25 2045  Type & Screen  Once         01/11/25 2044 01/11/25 2045  Insert Peripheral IV  Once         01/11/25 2044 01/11/25 2045  Saline Lock & Maintain IV Access  Continuous         01/11/25 2044 01/11/25 2045  Diet: Liquid; Clear Liquid; Fluid Consistency: Thin (IDDSI 0)  Diet Effective Now,   Status:  Canceled         01/11/25 2044 01/11/25 2045  CBC Auto Differential  PROCEDURE ONCE         01/11/25 2044 01/11/25 2044  sodium chloride 0.9 % flush 10 mL  As Needed,   Status:  Discontinued         01/11/25 2044 01/11/25 2044  sodium chloride 0.9 % infusion 40 mL  As Needed,   Status:  Discontinued         01/11/25 2044 01/11/25 2044  lidocaine (XYLOCAINE) 1 % injection 0.5 mL  Once As Needed,   Status:  Discontinued         01/11/25 2044 01/11/25 2044  lactated ringers bolus 1,000 mL  Once As Needed         01/11/25 2044 01/11/25 2044  acetaminophen (TYLENOL) tablet 650 mg  Every 4 Hours PRN,   Status:  Discontinued         01/11/25 2044 01/11/25 2044  butorphanol (STADOL) injection 2 mg  Every 3 Hours PRN,   Status:  Discontinued         01/11/25 2044 01/11/25 2044  ondansetron ODT (ZOFRAN-ODT) disintegrating tablet 4 mg  Every 6 Hours PRN,   Status:  Discontinued        Placed in \"Or\" Linked Group    01/11/25 2044 01/11/25 2044  ondansetron (ZOFRAN) injection 4 mg  Every 6 Hours PRN,   Status:  Discontinued        Placed in \"Or\" Linked Group    01/11/25 2044 01/11/25 2044  terbutaline (BRETHINE) injection 0.25 mg  As Needed,   Status:  Discontinued         01/11/25 2044 01/11/25 2044  mineral oil liquid 30 mL  Once As Needed,   Status:  Discontinued         01/11/25 2044 01/11/25 2044  famotidine (PEPCID) injection 20 mg  2 Times Daily PRN,   Status:  Discontinued        Placed in \"Or\" Linked Group    01/11/25 2044 01/11/25 2044  famotidine (PEPCID) " "tablet 20 mg  2 Times Daily PRN,   Status:  Discontinued        Placed in \"Or\" Linked Group    01/11/25 2044 01/11/25 2040  Admit To Obstetrics Inpatient  Once         01/11/25 2040 01/11/25 2040  Place Sequential Compression Device  Once,   Status:  Canceled         01/11/25 2040 01/11/25 2040  Maintain Sequential Compression Device  Continuous,   Status:  Canceled         01/11/25 2040 01/11/25 2016  Urinalysis, Microscopic Only - Urine, Clean Catch  Once        Comments: Collect and Hold for gestational age > 24 weeks      01/11/25 2015 01/11/25 2016  Urine Culture - Urine, Urine, Clean Catch  Once        Comments: Collect and Hold for gestational age > 24 weeks      01/11/25 2015 01/11/25 1954  Rapid Assay For ROM - Amniotic Fluid, Amniotic Sac  Once         01/11/25 1953 01/11/25 1936  Vital Signs  Per Hospital Policy        Comments: Repeat blood pressure every 30 minutes, until specified.    01/11/25 1936 01/11/25 1936  Fetal Nonstress Test  Once        Comments: For any patient >= 24 wks gestation.    01/11/25 1936 01/11/25 1936  Pulse Oximetry, Spot  Once         01/11/25 1936 01/11/25 1936  Urinalysis With Culture If Indicated - Urine, Clean Catch  Once        Comments: Collect and Hold for gestational age > 24 weeks      01/11/25 1936 01/11/25 1936  Continuous Uterine Monitoring - For Gestational Age >24 weeks  Once         01/11/25 1936 01/11/25 1936  NPO Diet NPO Type: Strict NPO  Diet Effective Now,   Status:  Canceled         01/11/25 1936 01/11/25 1936  Vaginal Exam  Once        Comments: Perform unless patient has vaginal bleeding without known placental site, known placental previa, <36 weeks with no abdominal , or complain of ROM and <36 weeks    01/11/25 1936    Unscheduled  Position Change - For Intra-Uterine Resusitation for Hypertonus, HyperStimulation or Non-Reassuring Fetal Status  As Needed       01/11/25 2044    Unscheduled  Apply Ice to Perineum  As " Needed      Comments: For 20 min q 2 hrs    25    Unscheduled  Waffle Cushion  As Needed      Comments: For perineal discomfort    25    Unscheduled  Donut Ring  As Needed      Comments: For perineal pain    25    Unscheduled  Kpad  As Needed      Comments: For pain    25    Unscheduled  Apply witch hazel pads / TUCKS to perineum as needed for comfort PRN  As Needed       25    Unscheduled  Warm compress  As Needed       25    Unscheduled  Apply ace wrap, tight bra, or binder  As Needed       25    Unscheduled  Apply ice packs  As Needed       25                     Operative/Procedure Notes (last 72 hours)        Diana Griffin MD at 25 0428           [facility]   Vaginal Delivery Note    Patient Name: Aruna Graves  : 2000  MRN: [id number]    Date of Delivery: 2025    Diagnosis     Pre & Post-Delivery:  Intrauterine pregnancy at 39w2d  Labor status: Spontaneous Onset of Labor    Pregnancy             Problem List    Transfer to Postpartum     Review the Delivery Report for details.     Delivery     Delivery: Vaginal, Spontaneous    YOB: 2025   Time of Birth:  Gestational Age 4:11 AM  39w2d     Anesthesia: None    Delivering clinician:     Forceps?   No   Vacuum? No    Shoulder dystocia present: No        Delivery narrative:  The patient was admitted in labor with SROM. Her GBS status was negative. The FHTs were reassuring throughout the labor and delivery course.  She progressed along a normal labor curve to completely dilated.  The patient pushed for one hour for a spontaneous vaginal delivery. The infant was bulb suctioned on the perineum and again after delivery.  The infant had good tone and cry.  The infant was placed on mothers abdomen. Gases were not sent. Cord blood was obtained. The placenta did follow spontaneously. The uterus was explored. A first degree laceration was  "repaired in the usual fashion.  TXA and methergine given for mild uterine atony with good response. The patient tolerated the procedure well.  Mother and infant are recovering well at this time    QBL:          Infant     Findings: female infant     Infant observations: Weight: No birth weight on file.  Length:   in  Observations/Comments:  scale 1     Apgars: 9  @ 1 minute /    9  @ 5 minutes   Infant Name: Rahel     Placenta & Cord         Placenta delivered  Spontaneous at   2025  4:14 AM    Cord: 3 vessels present.   Nuchal Cord?  no   Cord blood obtained: Yes   Cord gases obtained:  No   Cord gas results: Venous:  No results found for: \"PHCVEN\", \"BECVEN\"    Arterial:  No results found for: \"PHCART\", \"BECART\"     Repair     Episiotomy: None    No    Lacerations: Yes  Laceration Information  Laceration Repaired?   Perineal: 1st Yes   Periurethral:       Labial:       Sulcus:       Vaginal:       Cervical:         Suture used for repair: 3-0 Vicryl  Laceration Length for 3rd or 4th degree lacerations: no cm   Estimated Blood Loss:  300 cc     Quantitative Blood Loss:          Complications     none    Disposition     Mother to Mother Baby/Postpartum  in stable condition currently.  Baby to remains with mom  in stable condition currently.    Diana Griffin MD  25  04:32 EST          Electronically signed by Diana Griffin MD at 25 0432          Physician Progress Notes (last 72 hours)        Virgilio Boyle MD at 25 1132          [facility]   Obstetrics and Gynecology     2025    Name:Aruna Graves   MR#:[id number]    Vaginal Delivery Progress Note    HD#2    Subjective   Postpartum Day 1: 24 y.o. female  delivered at 39w2d  delivered a female infant.     The patient feels well.  Her pain is controlled.    She is ambulating well.  Patient describes her bleeding as thin lochia.    Breastfeeding/bottle:  The patient is currently breastfeeding.    Patient " Active Problem List   Diagnosis    Supervision of normal first pregnancy, antepartum    Vegetarian    Late prenatal care    Abnormal glucose in pregnancy, antepartum    Pregnancy       Objective   Vital Signs Range for the last 24 hours  Temp: Temp:  [97.5 °F (36.4 °C)-97.8 °F (36.6 °C)] 97.6 °F (36.4 °C) Temp src: Oral   BP: BP: (110-117)/(70-73) 110/71        Pulse: Heart Rate:  [] 98  RR: Resp:  [16-18] 18  Weight: 72.8 kg (160 lb 6.4 oz)  BMI:  Body mass index is 24.39 kg/m².    Results from last 7 days   Lab Units 01/13/25  0701 01/11/25  2157   WBC 10*3/mm3 15.72* 12.83*   HEMOGLOBIN g/dL 9.1* 11.2*   HEMATOCRIT % 28.6* 32.0*   PLATELETS 10*3/mm3 236 268     Results from last 7 days   Lab Units 01/11/25  2157   SODIUM mmol/L 135*   POTASSIUM mmol/L 4.1   CHLORIDE mmol/L 104   CO2 mmol/L 19.0*   BUN mg/dL 8   CREATININE mg/dL 0.61   CALCIUM mg/dL 9.3   ALK PHOS U/L 129*   ALT (SGPT) U/L 15   AST (SGOT) U/L 20   GLUCOSE mg/dL 88       Physical Exam  Vitals and nursing note reviewed.   Constitutional:       General: She is not in acute distress.     Appearance: Normal appearance. She is well-developed. She is not ill-appearing.   HENT:      Head: Normocephalic.   Pulmonary:      Effort: Pulmonary effort is normal.   Abdominal:      General: Abdomen is flat. There is no distension.      Palpations: Abdomen is soft. There is no mass.      Tenderness: There is no abdominal tenderness.   Genitourinary:     Vagina: Normal.      Comments: Lochia is scant  Fundus is firm   Musculoskeletal:         General: No swelling or tenderness.      Comments: No calf tenderness or swelling    Neurological:      Mental Status: She is alert and oriented to person, place, and time.   Psychiatric:         Behavior: Behavior normal.         Thought Content: Thought content normal.         Judgment: Judgment normal.         Assessment/Plan   1.  PPD# 1    Pregnancy      Plan:   Continue routine postpartum care  Plan discharge  tomorrow    Virgilio Boyle MD  1/13/2025 11:32 EST      Electronically signed by Virgilio Boyle MD at 01/13/25 1132       Consult Notes (last 72 hours)  Notes from 01/10/25 1453 through 01/13/25 1453   No notes of this type exist for this encounter.

## 2025-01-13 NOTE — LACTATION NOTE
This note was copied from a baby's chart.  Informed PT, [facility] is available to help with BF until 2000. Mom reports baby is BF well, but She will need a prescription from her MD to get a Spectra pump. [employer] is not going to cover it, so she will go with a different company.Mom needs an actually prescription. Dr Boyle was in the room and is aware with the situation.  A blue script was pulled out from the printer with the help with the CRN. Will asked DR Boyle to sign it.  PT denieis any questions. Encouraged to call if needing BF help

## 2025-01-14 NOTE — DISCHARGE SUMMARY
[facility]   Obstetrics and Gynecology   Vaginal delivery Discharge Summary      Date of Admission: 2025    Date of Discharge:  2025      Patient: Aruna Graves      MR#:[id number]    Delivering OB: Diana Griffin    Primary OB:  Michelle Munroe MD       Discharge Diagnosis:   Vaginal Delivery at 39w2d, uncomplicated recovery    Pregnancy     (normal spontaneous vaginal delivery)      Procedures:  Vaginal, Spontaneous    2025   4:11 AM       Anesthesia:  None    Hospital Course  Patient is a 24 y.o. female  at 39w2d status post vaginal delivery.    Uneventful recovery.  Patient is ambulating, tolerating a regular diet.  Perineum is intact.    Infant:   female fetus 3095 g (6 lb 13.2 oz) with Apgar scores of 9 , 9  at five minutes.    Condition on Discharge:  Stable    Vital Signs  Temp:  [97.6 °F (36.4 °C)-97.8 °F (36.6 °C)] 97.6 °F (36.4 °C)  Heart Rate:  [] 98  Resp:  [16-18] 18  BP: (110-113)/(70-71) 110/71    Results from last 7 days   Lab Units 25  0701 25  2157   WBC 10*3/mm3 15.72* 12.83*   HEMOGLOBIN g/dL 9.1* 11.2*   HEMATOCRIT % 28.6* 32.0*   PLATELETS 10*3/mm3 236 268     Results from last 7 days   Lab Units 25  2157   SODIUM mmol/L 135*   POTASSIUM mmol/L 4.1   CHLORIDE mmol/L 104   CO2 mmol/L 19.0*   BUN mg/dL 8   CREATININE mg/dL 0.61   CALCIUM mg/dL 9.3   BILIRUBIN mg/dL 0.3   ALK PHOS U/L 129*   ALT (SGPT) U/L 15   AST (SGOT) U/L 20   GLUCOSE mg/dL 88       Discharge Disposition  Home or Self Care    Discharge Medications     Discharge Medications        New Medications        Instructions Start Date   ibuprofen 800 MG tablet  Commonly known as: ADVIL,MOTRIN   800 mg, Oral, Every 8 Hours PRN             Continue These Medications        Instructions Start Date   PRENATAL VITAMIN PO   Take  by mouth.               Discharge Diet: Regular    Activity at Discharge:   Activity Instructions       Pelvic Rest              Follow-up  Appointments  Future Appointments   Date Time Provider Department Center   1/16/2025  9:45 AM Lesly Robb APRN [facility]   1/22/2025  9:30 AM Amy Drew MD [facility]     Additional Instructions for the Follow-ups that You Need to Schedule       Call MD for problems / concerns.   As directed      Call for problems with:   1.  Heavy bleeding:  Greater than a pad an hour for more than 2 hours  2.  Fever greater than 101.3   3.  Redness of the episiotomy or vaginal laceration and/or severe pain increased from discharge pain.    4.  Signs of postpartum preeclampsia:  headache, visual changes, elevated blood pressure    Order Comments: Call for problems with: 1.  Heavy bleeding:  Greater than a pad an hour for more than 2 hours 2.  Fever greater than 101.3 3.  Redness of the episiotomy or vaginal laceration and/or severe pain increased from discharge pain.  4.  Signs of postpartum preeclampsia:  headache, visual changes, elevated blood pressure         Discharge Follow-up with Specified Provider: Elias; 3 Weeks   As directed      To: Elias   Follow Up: 3 Weeks   Follow Up Details: with Primary OB                  Prenatal labs/vax:   There is no immunization history for the selected administration types on file for this patient.    External Prenatal Results       Pregnancy Outside Results - Transcribed From Office Records - See Scanned Records For Details       Test Value Date Time    ABO  AB  01/11/25 2157    Rh  Positive  01/11/25 2157    Antibody Screen  Negative  01/11/25 2157       Negative  09/03/24 1028    Varicella IgG  258 index 09/03/24 1028    Rubella  1.26 index 09/03/24 1028    Hgb  9.1 g/dL 01/13/25 0701       11.2 g/dL 01/11/25 2157       11.5 g/dL 11/05/24 1047       12.3 g/dL 09/03/24 1028    Hct  28.6 % 01/13/25 0701       32.0 % 01/11/25 2157       35.0 % 11/05/24 1047       37.7 % 09/03/24 1028    HgB A1c   4.7 % 09/03/24 1028    1h GTT  157 mg/dL 11/05/24 1047    3h  GTT Fasting  75 mg/dL 11/15/24 0904    3h GTT 1 hour  160 mg/dL 11/15/24 0904    3h GTT 2 hour  131 mg/dL 11/15/24 0904    3h GTT 3 hour   60 mg/dL 11/15/24 0904    Gonorrhea (discrete)  Negative  09/03/24 1318    Chlamydia (discrete)  Negative  09/03/24 1318    RPR  Non Reactive  09/03/24 1028    Syphils cascade: TP-Ab (FTA)  Non-Reactive  01/11/25 2157       Non Reactive  11/05/24 1047    TP-Ab  Non-Reactive  01/11/25 2157       Non Reactive  11/05/24 1047    TP-Ab (EIA)       TPPA       HBsAg  Negative  09/03/24 1028    Herpes Simplex Virus PCR       Herpes Simplex VIrus Culture       HIV  Non Reactive  09/03/24 1028    Hep C RNA Quant PCR       Hep C Antibody       AFP       NIPT       Cystic Fibrosis ([place])       Cystic Fibroisis        Spinal Muscular atrophy       Fragile X       Group B Strep  Negative  12/26/24 1638    GBS Susceptibility to Clindamycin       GBS Susceptibility to Erythromycin       Fetal Fibronectin       Genetic Testing, Maternal Blood                 Drug Screening       Test Value Date Time    Urine Drug Screen       Amphetamine Screen       Barbiturate Screen       Benzodiazepine Screen       Methadone Screen       Phencyclidine Screen       Opiates Screen       THC Screen       Cocaine Screen       Propoxyphene Screen       Buprenorphine Screen       Methamphetamine Screen       Oxycodone Screen       Tricyclic Antidepressants Screen                 Legend    ^: Historical                              Virgilio Boyle MD  01/13/25  19:51 EST

## 2025-01-16 NOTE — PROGRESS NOTES
"Continued Stay Note  [facility]     Patient Name: Aruna Graves  MRN: [id number]  Today's Date: 1/16/2025    Admit Date: 1/11/2025    Plan: Infant may discharge to mother when medically ready; CSW will follow cord tox. ELANA Chappell.   Discharge Plan       Row Name 01/16/25 1404       Plan    Plan Comments Mother: Aruna Graves, MRN: [id number]; infant: FallonsGirl \"Burleigh\" Star, MRN: [id number]. CSW has reviewed infant's cord toxicology results and infant's cord was negative for substances. Mandated CPS reporting is not required at this time. ELANA Chappell.                   Discharge Codes    No documentation.                 Expected Discharge Date and Time       Expected Discharge Date Expected Discharge Time    Jan 13, 2025               JUAN DAVID Arizmendi    "

## 2025-01-21 ENCOUNTER — TELEPHONE (OUTPATIENT)
Dept: OBSTETRICS AND GYNECOLOGY | Age: 25
End: 2025-01-21
Payer: OTHER GOVERNMENT

## 2025-01-21 NOTE — TELEPHONE ENCOUNTER
Caller:  SHARLENE RUVALCABA    Relationship to patient: SELF    Best call back number: [phone] (home)       Patient is needing: PT DELIVERED ON 01/12. SHE NEEDS A THREE WEEK FOLLOW UP WITH DR RAMÍREZ. HUB DID NOT HAVE AVAILABILITY.

## 2025-01-22 ENCOUNTER — MATERNAL SCREENING (OUTPATIENT)
Dept: CALL CENTER | Facility: HOSPITAL | Age: 25
End: 2025-01-22
Payer: OTHER GOVERNMENT

## 2025-01-22 NOTE — OUTREACH NOTE
Maternal Screening Survey      Flowsheet Row Responses   [facility]   Attempt successful? Yes   Call start time 1157   Call end time 1200   I have been able to laugh and see the funny side of things. 0   I have looked forward with enjoyment to things. 0   I have blamed myself unnecessarily when things went wrong. 0   I have been anxious or worried for no good reason. 0   I have felt scared or panicky for no good reason. 0   Things have been getting on top of me. 0   I have been so unhappy that I have had difficulty sleeping. 0   I have felt sad or miserable. 0   I have been so unhappy that I have been crying. 0   The thought of harming myself has occurred to me. 0   [place]  Depression Scale Total 0   Did any of your parents have problems with alcohol or drug use? No   Do any of your peers have problems with alcohol or drug use? No   Does your partner have problems with alcohol or drug use? No   Before you were pregnant did you have problems with alcohol or drug use? (past) No   In the past month, did you drink beer, wine, liquor or use any other drugs? (pregnancy) No   Maternal Screening call completed Yes   Call end time 1200              Nalini LAWRENCE - Registered Nurse

## 2025-02-06 ENCOUNTER — POSTPARTUM VISIT (OUTPATIENT)
Dept: OBSTETRICS AND GYNECOLOGY | Age: 25
End: 2025-02-06
Payer: OTHER GOVERNMENT

## 2025-02-06 VITALS
DIASTOLIC BLOOD PRESSURE: 74 MMHG | WEIGHT: 139 LBS | BODY MASS INDEX: 21.07 KG/M2 | SYSTOLIC BLOOD PRESSURE: 118 MMHG | HEIGHT: 68 IN

## 2025-02-06 NOTE — PROGRESS NOTES
Patient presents for a postpartum visit. She is 3 weeks postpartum following a spontaneous vaginal delivery. I have fully reviewed the prenatal and intrapartum course. The delivery was at 39 gestational weeks. Outcome: spontaneous vaginal delivery. Anesthesia: none. Postpartum course has been good. Baby's course has been good. Baby is feeding by both breast and bottle -  not asked . Bleeding no bleeding. Bowel function is normal. Bladder function is normal. Patient is not sexually active. Contraception method is abstinence. Postpartum depression screening: negative.    The following portions of the patient's history were reviewed and updated as appropriate: allergies, current medications, past family history, past medical history, past social history, past surgical history, and problem list.    She has good support at home    Denies PPD      Postpartum inventory   Infant feeding: (Patient-Rptd) Breast  Postpartum pain: (Patient-Rptd) None  Vaginal bleeding : (Patient-Rptd) Mostly light - only using liner  Depression/Anxiety: (Patient-Rptd) None  Contracepton: (Patient-Rptd) Condoms      Postpartum Depression: Low Risk  (2025)    [place]  Depression Scale     Last EPDS Total Score: 0     Last EPDS Self Harm Result: Never   Recent Concern: Postpartum Depression - Medium Risk (2025)    [place]  Depression Scale     Last EPDS Total Score: 5     Last EPDS Self Harm Result: Never     Review of Systems  Genitourinary:positive for pp exam        Vitals:    25 1120   BP: 118/74       General:  alert, appears stated age, and cooperative    Breasts:  na   Lungs: na   Heart:  na   Abdomen: soft, non-tender; bowel sounds normal; no masses,  no organomegaly and n    Vulva:  not evaluated   Vagina: not evaluated   Cervix:  na   Corpus: not examined   Adnexa:  not evaluated   Rectal Exam: Not performed.         3 wks postpartum exam. Pap smear not done at today's visit.    1. Contraception:  abstinence  2. She is planning to use condoms for BC  3. Follow up in: 3 weeks or as needed.

## 2025-02-27 ENCOUNTER — POSTPARTUM VISIT (OUTPATIENT)
Dept: OBSTETRICS AND GYNECOLOGY | Age: 25
End: 2025-02-27
Payer: OTHER GOVERNMENT

## 2025-02-27 VITALS
WEIGHT: 139 LBS | HEIGHT: 68 IN | DIASTOLIC BLOOD PRESSURE: 70 MMHG | SYSTOLIC BLOOD PRESSURE: 108 MMHG | BODY MASS INDEX: 21.07 KG/M2

## 2025-02-27 DIAGNOSIS — Z12.4 SCREENING FOR CERVICAL CANCER: Primary | ICD-10-CM

## 2025-02-27 NOTE — PROGRESS NOTES
"[facility]   Obstetrics and Gynecology   Postpartum Visit    2025    Patient: Aruna Graves          MR#:[id number]    History of Present Illness    Chief Complaint   Patient presents with    Postpartum Care     CC: postpartum delivered 25 via vaginal baby rolando rosenbaum 6lb 13oz. Breastfeeding.        24 y.o. female  status post vaginal delivery  presents for postpartum visit without complaints.  Mood stable.  Breast-feeding without issue.  Bleeding > has resolved, possible period.  No intercourse since delivery.    Contraception: declines  Vaccines: up to date   Pap: collected today   ________________________________________  Patient Active Problem List   Diagnosis    Supervision of normal first pregnancy, antepartum    Vegetarian    Late prenatal care    Abnormal glucose in pregnancy, antepartum    Pregnancy     (normal spontaneous vaginal delivery)       Past Medical History:   Diagnosis Date    Pregnancy 2025    Vegetarian diet        Past Surgical History:   Procedure Laterality Date    TONSILLECTOMY  2013    WISDOM TOOTH EXTRACTION  2018       Social History     Tobacco Use   Smoking Status Never    Passive exposure: Never   Smokeless Tobacco Never       has a current medication list which includes the following prescription(s): prenatal vit-fe fumarate-fa and ibuprofen.  ________________________________________         Review of Systems   All other systems reviewed and are negative.      Objective     /70   Ht 172 cm (67.72\")   Wt 63 kg (139 lb)   Breastfeeding Yes   BMI 21.31 kg/m²    BP Readings from Last 3 Encounters:   25 108/70   25 118/74   25 110/71      Wt Readings from Last 3 Encounters:   25 63 kg (139 lb)   25 63 kg (139 lb)   25 72.8 kg (160 lb 6.4 oz)      BMI: Estimated body mass index is 21.31 kg/m² as calculated from the following:    Height as of this encounter: 172 cm (67.72\").    Weight as of this " encounter: 63 kg (139 lb).    EXAM     General:     Patient appears well in NAD  Respiratory: Normal effort, no distress  Breast:  declined  Abdomen: Soft, NT, no acute findings  Pelvic:  Scant lochia, perineum without signs of infection, uterus small and nontender  Ext:  No cyanosis or edema    Assessment:    Diagnoses and all orders for this visit:    1. Screening for cervical cancer (Primary)  -     IGP,rfx Aptima HPV All Pth    2. Postpartum follow-up  Comments:  - Doing well, exam within normal limits today and Pap smear collected.  -Discussed future pregnancy planning.  -RTC 1 year for annual.        Plan:  No follow-ups on file.      Amy Drew MD  2/27/2025 13:28 EST

## 2025-03-05 LAB
CONV .: NORMAL
CYTOLOGIST CVX/VAG CYTO: NORMAL
CYTOLOGY CVX/VAG DOC CYTO: NORMAL
CYTOLOGY CVX/VAG DOC THIN PREP: NORMAL
DX ICD CODE: NORMAL
OTHER STN SPEC: NORMAL
SERVICE CMNT-IMP: NORMAL
STAT OF ADQ CVX/VAG CYTO-IMP: NORMAL

## 2025-08-19 ENCOUNTER — OFFICE VISIT (OUTPATIENT)
Dept: OBSTETRICS AND GYNECOLOGY | Age: 25
End: 2025-08-19
Payer: COMMERCIAL

## 2025-08-19 VITALS
HEIGHT: 68 IN | SYSTOLIC BLOOD PRESSURE: 112 MMHG | BODY MASS INDEX: 19.25 KG/M2 | WEIGHT: 127 LBS | DIASTOLIC BLOOD PRESSURE: 70 MMHG

## 2025-08-19 DIAGNOSIS — O46.8X1 SUBCHORIONIC HEMATOMA IN FIRST TRIMESTER, SINGLE OR UNSPECIFIED FETUS: ICD-10-CM

## 2025-08-19 DIAGNOSIS — Z34.81 PRENATAL CARE, SUBSEQUENT PREGNANCY IN FIRST TRIMESTER: Primary | ICD-10-CM

## 2025-08-19 DIAGNOSIS — O41.8X10 SUBCHORIONIC HEMATOMA IN FIRST TRIMESTER, SINGLE OR UNSPECIFIED FETUS: ICD-10-CM

## 2025-08-19 DIAGNOSIS — O09.891 SHORT INTERVAL BETWEEN PREGNANCIES AFFECTING PREGNANCY IN FIRST TRIMESTER, ANTEPARTUM: ICD-10-CM

## 2025-08-19 PROBLEM — Z34.00 SUPERVISION OF NORMAL FIRST PREGNANCY, ANTEPARTUM: Status: RESOLVED | Noted: 2024-09-03 | Resolved: 2025-08-19

## 2025-08-19 PROBLEM — O09.30 LATE PRENATAL CARE: Status: RESOLVED | Noted: 2024-09-03 | Resolved: 2025-08-19

## 2025-08-19 PROBLEM — O99.810 ABNORMAL GLUCOSE IN PREGNANCY, ANTEPARTUM: Status: RESOLVED | Noted: 2024-11-07 | Resolved: 2025-08-19

## 2025-08-20 LAB
ABO GROUP BLD: NORMAL
BASOPHILS # BLD AUTO: 0 X10E3/UL (ref 0–0.2)
BASOPHILS NFR BLD AUTO: 0 %
BLD GP AB SCN SERPL QL: NEGATIVE
EOSINOPHIL # BLD AUTO: 0 X10E3/UL (ref 0–0.4)
EOSINOPHIL NFR BLD AUTO: 1 %
ERYTHROCYTE [DISTWIDTH] IN BLOOD BY AUTOMATED COUNT: 11.9 % (ref 11.7–15.4)
HBV SURFACE AG SERPL QL IA: NEGATIVE
HCT VFR BLD AUTO: 40.1 % (ref 34–46.6)
HCV AB SERPL QL IA: NON REACTIVE
HCV AB SERPL QL IA: NORMAL
HGB A MFR BLD ELPH: 97.2 % (ref 96.4–98.8)
HGB A2 MFR BLD ELPH: 2.8 % (ref 1.8–3.2)
HGB BLD-MCNC: 13 G/DL (ref 11.1–15.9)
HGB F MFR BLD ELPH: 0 % (ref 0–2)
HGB FRACT BLD-IMP: NORMAL
HGB S MFR BLD ELPH: 0 %
HIV 1+2 AB+HIV1 P24 AG SERPL QL IA: NON REACTIVE
IMM GRANULOCYTES # BLD AUTO: 0 X10E3/UL (ref 0–0.1)
IMM GRANULOCYTES NFR BLD AUTO: 0 %
LYMPHOCYTES # BLD AUTO: 1.4 X10E3/UL (ref 0.7–3.1)
LYMPHOCYTES NFR BLD AUTO: 18 %
MCH RBC QN AUTO: 30.7 PG (ref 26.6–33)
MCHC RBC AUTO-ENTMCNC: 32.4 G/DL (ref 31.5–35.7)
MCV RBC AUTO: 95 FL (ref 79–97)
MONOCYTES # BLD AUTO: 0.4 X10E3/UL (ref 0.1–0.9)
MONOCYTES NFR BLD AUTO: 4 %
NEUTROPHILS # BLD AUTO: 6.2 X10E3/UL (ref 1.4–7)
NEUTROPHILS NFR BLD AUTO: 77 %
PLATELET # BLD AUTO: 255 X10E3/UL (ref 150–450)
RBC # BLD AUTO: 4.24 X10E6/UL (ref 3.77–5.28)
RH BLD: POSITIVE
RPR SER QL: NON REACTIVE
RUBV IGG SERPL IA-ACNC: 1.29 INDEX
VZV IGG SER QL IA: REACTIVE
WBC # BLD AUTO: 8 X10E3/UL (ref 3.4–10.8)

## 2025-08-21 LAB
BACTERIA UR CULT: NO GROWTH
BACTERIA UR CULT: NORMAL
C TRACH RRNA SPEC QL NAA+PROBE: NEGATIVE
N GONORRHOEA RRNA SPEC QL NAA+PROBE: NEGATIVE
T VAGINALIS RRNA SPEC QL NAA+PROBE: NEGATIVE